# Patient Record
Sex: FEMALE | Race: WHITE | NOT HISPANIC OR LATINO | Employment: FULL TIME | URBAN - METROPOLITAN AREA
[De-identification: names, ages, dates, MRNs, and addresses within clinical notes are randomized per-mention and may not be internally consistent; named-entity substitution may affect disease eponyms.]

---

## 2017-04-24 DIAGNOSIS — F32.A DEPRESSION, UNSPECIFIED DEPRESSION TYPE: ICD-10-CM

## 2017-04-25 RX ORDER — VENLAFAXINE HYDROCHLORIDE 150 MG/1
150 TABLET, EXTENDED RELEASE ORAL
Qty: 30 EACH | Refills: 1 | Status: SHIPPED | OUTPATIENT
Start: 2017-04-25 | End: 2017-05-16 | Stop reason: SDUPTHER

## 2017-05-16 ENCOUNTER — OFFICE VISIT (OUTPATIENT)
Dept: FAMILY MEDICINE CLINIC | Facility: CLINIC | Age: 42
End: 2017-05-16

## 2017-05-16 VITALS
DIASTOLIC BLOOD PRESSURE: 72 MMHG | WEIGHT: 122.4 LBS | HEART RATE: 78 BPM | SYSTOLIC BLOOD PRESSURE: 100 MMHG | HEIGHT: 65 IN | BODY MASS INDEX: 20.39 KG/M2 | OXYGEN SATURATION: 98 %

## 2017-05-16 DIAGNOSIS — F32.A FATIGUE DUE TO DEPRESSION: ICD-10-CM

## 2017-05-16 DIAGNOSIS — L40.9 PSORIASIS: ICD-10-CM

## 2017-05-16 DIAGNOSIS — M25.50 PAIN, JOINT, MULTIPLE SITES: Primary | ICD-10-CM

## 2017-05-16 DIAGNOSIS — R53.83 FATIGUE DUE TO DEPRESSION: ICD-10-CM

## 2017-05-16 DIAGNOSIS — F33.41 RECURRENT MAJOR DEPRESSIVE DISORDER, IN PARTIAL REMISSION (HCC): ICD-10-CM

## 2017-05-16 DIAGNOSIS — F32.A DEPRESSION, UNSPECIFIED DEPRESSION TYPE: ICD-10-CM

## 2017-05-16 PROCEDURE — 99214 OFFICE O/P EST MOD 30 MIN: CPT | Performed by: INTERNAL MEDICINE

## 2017-05-16 PROCEDURE — 73130 X-RAY EXAM OF HAND: CPT | Performed by: INTERNAL MEDICINE

## 2017-05-16 RX ORDER — VENLAFAXINE HYDROCHLORIDE 150 MG/1
150 TABLET, EXTENDED RELEASE ORAL
Qty: 90 EACH | Refills: 1 | Status: SHIPPED | OUTPATIENT
Start: 2017-05-16 | End: 2017-05-18 | Stop reason: SDUPTHER

## 2017-05-18 DIAGNOSIS — F32.A DEPRESSION, UNSPECIFIED DEPRESSION TYPE: ICD-10-CM

## 2017-05-18 RX ORDER — VENLAFAXINE HYDROCHLORIDE 150 MG/1
150 TABLET, EXTENDED RELEASE ORAL
Qty: 90 EACH | Refills: 1 | Status: CANCELLED | OUTPATIENT
Start: 2017-05-18

## 2017-05-18 RX ORDER — VENLAFAXINE HYDROCHLORIDE 150 MG/1
150 TABLET, EXTENDED RELEASE ORAL
Qty: 90 EACH | Refills: 1 | Status: SHIPPED | OUTPATIENT
Start: 2017-05-18 | End: 2017-08-18 | Stop reason: SDUPTHER

## 2017-05-23 LAB
BASOPHILS # BLD AUTO: 0.04 10*3/MM3 (ref 0–0.2)
BASOPHILS NFR BLD AUTO: 0.7 % (ref 0–1.5)
CCP IGA+IGG SERPL IA-ACNC: 3 UNITS (ref 0–19)
CRP SERPL-MCNC: <0.3 MG/L (ref 0–4.9)
EOSINOPHIL # BLD AUTO: 0.36 10*3/MM3 (ref 0–0.7)
EOSINOPHIL NFR BLD AUTO: 6.1 % (ref 0.3–6.2)
ERYTHROCYTE [DISTWIDTH] IN BLOOD BY AUTOMATED COUNT: 12.9 % (ref 11.7–13)
ERYTHROCYTE [SEDIMENTATION RATE] IN BLOOD BY WESTERGREN METHOD: 2 MM/HR (ref 0–32)
HCT VFR BLD AUTO: 43.4 % (ref 35.6–45.5)
HGB BLD-MCNC: 14.3 G/DL (ref 11.9–15.5)
HLA-B27 QL NAA+PROBE: NEGATIVE
IMM GRANULOCYTES # BLD: 0.02 10*3/MM3 (ref 0–0.03)
IMM GRANULOCYTES NFR BLD: 0.3 % (ref 0–0.5)
IRON SATN MFR SERPL: 31 % (ref 20–50)
IRON SERPL-MCNC: 138 MCG/DL (ref 37–145)
LYMPHOCYTES # BLD AUTO: 1.65 10*3/MM3 (ref 0.9–4.8)
LYMPHOCYTES NFR BLD AUTO: 27.7 % (ref 19.6–45.3)
MCH RBC QN AUTO: 31 PG (ref 26.9–32)
MCHC RBC AUTO-ENTMCNC: 32.9 G/DL (ref 32.4–36.3)
MCV RBC AUTO: 94.1 FL (ref 80.5–98.2)
MONOCYTES # BLD AUTO: 0.34 10*3/MM3 (ref 0.2–1.2)
MONOCYTES NFR BLD AUTO: 5.7 % (ref 5–12)
NEUTROPHILS # BLD AUTO: 3.54 10*3/MM3 (ref 1.9–8.1)
NEUTROPHILS NFR BLD AUTO: 59.5 % (ref 42.7–76)
PLATELET # BLD AUTO: 258 10*3/MM3 (ref 140–500)
RBC # BLD AUTO: 4.61 10*6/MM3 (ref 3.9–5.2)
RHEUMATOID FACT SERPL-ACNC: <10 IU/ML (ref 0–13.9)
T3FREE SERPL-MCNC: 2.7 PG/ML (ref 2–4.4)
T4 FREE SERPL-MCNC: 0.96 NG/DL (ref 0.93–1.7)
TIBC SERPL-MCNC: 449 MCG/DL
TSH SERPL DL<=0.005 MIU/L-ACNC: 1.95 MIU/ML (ref 0.27–4.2)
UIBC SERPL-MCNC: 311 MCG/DL
VIT B12 SERPL-MCNC: 401 PG/ML (ref 211–946)
WBC # BLD AUTO: 5.95 10*3/MM3 (ref 4.5–10.7)

## 2017-06-28 ENCOUNTER — OFFICE VISIT (OUTPATIENT)
Dept: FAMILY MEDICINE CLINIC | Facility: CLINIC | Age: 42
End: 2017-06-28

## 2017-06-28 VITALS
HEIGHT: 65 IN | WEIGHT: 121.6 LBS | DIASTOLIC BLOOD PRESSURE: 72 MMHG | SYSTOLIC BLOOD PRESSURE: 106 MMHG | OXYGEN SATURATION: 98 % | HEART RATE: 84 BPM | BODY MASS INDEX: 20.26 KG/M2

## 2017-06-28 DIAGNOSIS — J06.9 ACUTE URI: ICD-10-CM

## 2017-06-28 DIAGNOSIS — M25.50 PAIN, JOINT, MULTIPLE SITES: Primary | ICD-10-CM

## 2017-06-28 PROCEDURE — 99214 OFFICE O/P EST MOD 30 MIN: CPT | Performed by: INTERNAL MEDICINE

## 2017-06-28 RX ORDER — MELOXICAM 15 MG/1
15 TABLET ORAL DAILY
Qty: 90 TABLET | Refills: 0 | Status: SHIPPED | OUTPATIENT
Start: 2017-06-28 | End: 2017-12-06 | Stop reason: ALTCHOICE

## 2017-06-28 RX ORDER — RANITIDINE 150 MG/1
150 CAPSULE ORAL 2 TIMES DAILY
Qty: 180 CAPSULE | Refills: 0 | Status: SHIPPED | OUTPATIENT
Start: 2017-06-28 | End: 2018-06-28

## 2017-07-12 ENCOUNTER — OFFICE VISIT (OUTPATIENT)
Dept: OBSTETRICS AND GYNECOLOGY | Age: 42
End: 2017-07-12

## 2017-07-12 VITALS
BODY MASS INDEX: 20.66 KG/M2 | HEIGHT: 65 IN | WEIGHT: 124 LBS | DIASTOLIC BLOOD PRESSURE: 70 MMHG | SYSTOLIC BLOOD PRESSURE: 98 MMHG

## 2017-07-12 DIAGNOSIS — Z12.31 VISIT FOR SCREENING MAMMOGRAM: ICD-10-CM

## 2017-07-12 DIAGNOSIS — Z01.419 WELL WOMAN EXAM WITH ROUTINE GYNECOLOGICAL EXAM: Primary | ICD-10-CM

## 2017-07-12 LAB
BILIRUB BLD-MCNC: NEGATIVE MG/DL
GLUCOSE UR STRIP-MCNC: NEGATIVE MG/DL
KETONES UR QL: NEGATIVE
LEUKOCYTE EST, POC: NEGATIVE
NITRITE UR-MCNC: NEGATIVE MG/ML
PH UR: 7 [PH] (ref 5–8)
PROT UR STRIP-MCNC: NEGATIVE MG/DL
RBC # UR STRIP: NEGATIVE /UL
SP GR UR: 1.02 (ref 1–1.03)
UROBILINOGEN UR QL: NORMAL

## 2017-07-12 PROCEDURE — 99396 PREV VISIT EST AGE 40-64: CPT | Performed by: OBSTETRICS & GYNECOLOGY

## 2017-07-12 PROCEDURE — 81003 URINALYSIS AUTO W/O SCOPE: CPT | Performed by: OBSTETRICS & GYNECOLOGY

## 2017-07-12 NOTE — PROGRESS NOTES
"ANNUAL GYN EXAM        2017    Subjective     Xochilt Alfonso is a 42 y.o., , White  Female.    Chief Complaint   Patient presents with   • Gynecologic Exam     Xochilt is here for her Annual Exam and Mammogram.  No gyn complaints        History of Present Illness:     Gyn Complaints:  No GYN complaints     1.Menstrual Hx:  LMP  2017, sometimes she starts four days early, she had a postpartum tubal in .     2.Pap Smear Hx:  ,CIS. , LLETZ= JUAN ALBERTO-2 and 3.  We will always have yearly Pap smears.      3.Breast / Ovarian Hx:  Regular SBE.       Family history of breast cancer: None     Family history of ovarian cancer:   Mother had cervical cancer dx at 60.  Mother's half sister dx ovarian cancer in her early 60's    4.Family Hx of Colon Ca:  Paternal Grandfather dx in his late 50's      Family Hx of Uterine Ca:   Mother, cervical.    5. New Past Medical History: None   Past Medical History:   Diagnosis Date   • Acute sinusitis    • Anemia    • Bronchitis    • CIS (carcinoma in situ of cervix)     LLETZ   • Depression 2016   • Depression    • Dysmenorrhea    • Dyspareunia in female    • Fatigue    • Hot flashes    • Menopause     40'S    • Menorrhagia    • Pelvic pain    • Pituitary microadenoma    • Post partum depression    • Prolactinoma    • Psoriasis 2017   • Vaginal delivery        \"NATALIE\"    \"DEBBI\"     \"STAR\"     \"RONY\"    \"MATTHEW\"    • Varicella         6. New Past Surgical History: None   Past Surgical History:   Procedure Laterality Date   • AUGMENTATION MAMMAPLASTY     • BREAST AUGMENTATION      Dr. Minor French   • CERVICAL BIOPSY  W/ LOOP ELECTRODE EXCISION      LLETZ of the cervix for CIS on bx.  Path showed JUAN ALBERTO-2 and JUAN ALBERTO-3.     • DILATATION AND CURETTAGE      SAB, blighted ovum   • POSTPARTUM TUBAL LIGATION  2015   • WISDOM TOOTH EXTRACTION      4         7. New Family Medical History: None "   Family History   Problem Relation Age of Onset   • Heart disease Maternal Grandmother    • Lung cancer Maternal Grandfather    • Alcohol abuse Maternal Grandfather    • Colon cancer Paternal Grandfather    • Alcohol abuse Paternal Grandfather    • Arthritis Mother    • Depression Mother    • Cervical cancer Mother    • Alcohol abuse Father    • Arthritis Father    • Depression Father    • Hyperlipidemia Father    • Hypertension Father    • No Known Problems Daughter    • No Known Problems Daughter    • No Known Problems Daughter    • No Known Problems Daughter    • No Known Problems Daughter    • No Known Problems Brother    • No Known Problems Son    • Osteoporosis Paternal Grandmother    • Arthritis Paternal Grandmother    • Diabetes Maternal Aunt    • No Known Problems Paternal Aunt    • No Known Problems Brother    • BRCA 1/2 Neg Hx    • Breast cancer Neg Hx    • Endometrial cancer Neg Hx    • Ovarian cancer Neg Hx          8.   OB History    Para Term  AB SAB TAB Ectopic Multiple Living   5 4 3 1 1 1 0 0 0 4      # Outcome Date GA Lbr Miki/2nd Weight Sex Delivery Anes PTL Lv   5 SAB  7w0d             Birth Comments: DNC, blighted ovum.   4   35w0d   F Vag-Spont   Y      Birth Comments: PPROM   3 Term  40w0d   F Vag-Spont   Y      Birth Comments: Procardia for early cervical dilation.  Had a LLETZ biopsy of cervix in .   2 Term  40w0d   F Vag-Spont   Y   1 Term  40w0d   F Vag-Spont   Y           9.   Health Maintenance   Topic Date Due   • TDAP/TD VACCINES (1 - Tdap) 1995   • INFLUENZA VACCINE  2017   • PAP SMEAR  2020       The following portions of the patient's history were reviewed / updated as appropriate: allergies, current medications, past medical history, past surgical history, past family history, past social history, and problem list.    Review of Systems   Constitutional: Positive for fatigue (Lack of energy).   HENT: Positive for  "rhinorrhea and sinus pressure (Seasonal).    Endocrine:        Night sweats before her cycles.   Musculoskeletal: Positive for arthralgias (Migratory arthritis).   Allergic/Immunologic: Positive for environmental allergies (seasonal allergies).   Psychiatric/Behavioral: Positive for dysphoric mood (depressionseasonal). The patient is nervous/anxious.        Objective     BP 98/70  Ht 65\" (165.1 cm)  Wt 124 lb (56.2 kg)  LMP 06/23/2017  BMI 20.63 kg/m2    PHYSICAL EXAM    Constitutional: Well-developed, well-nourished, thin white female, in no distress, alert and well oriented ×3.    Skin is warm, dry, without rash.       Neck appears normal, trachea in the midline.  Thyroid exam normal.          No cervical lymphadenopathy.    Lungs clear to auscultation.  Chest wall appears normal.    Heart with regular rhythm without murmur or gallop.    Breasts:         Right breast nontender, without dominant mass.  No nipple discharge.            No superficial skin changes.  No axillary adenopathy.        Left breast nontender, without dominant mass.  No nipple discharge.            No superficial skin changes.  No axillary adenopathy.      Abdomen is flat, soft and nontender.No palpable mass.           No hepatosplenomegaly.  Flanks are negative.          Bowel sounds normal.  No abdominal bruit.          No inguinal lymphadenopathy bilaterally.     Pelvic exam :  Vulva normal.           External genitalia normal.  BUS negative.             Introitus normal.  Vaginal mucosa normal.            Cervix normal, Pap with reflex., no cervical motion tenderness.          Uterus retroverted, normal size, normal shape, and position.          Adnexa are negative bilaterally.  No tenderness.  No palpable mass.          Rectovaginal exam negative .      Musc /Skel: Lower extremities without edema.     Neuro: Coordination is grossly normal.  Gait is normal.    Psych: Mood and affect is normal.  Behavior normal.  Thought content " normal.            Judgment normal.        Assessment/Plan   Xochilt was seen today for gynecologic exam.    Diagnoses and all orders for this visit:    Well woman exam with routine gynecological exam  -     POC Urinalysis Dipstick, Automated  -     Pap IG, Rfx HPV All Pth    Visit for screening mammogram    Health Counselling: Xochilt is doing well, although busy with 4 kids at the house.  Mental health is stable although she does have some anxiety and sometimes some depression.  The Effexor is helping.  She eats pretty well and is staying active.    COMMENTS:    Awais Edge MD

## 2017-07-14 LAB
CONV .: NORMAL
CYTOLOGIST CVX/VAG CYTO: NORMAL
CYTOLOGY CVX/VAG DOC THIN PREP: NORMAL
DX ICD CODE: NORMAL
HIV 1 & 2 AB SER-IMP: NORMAL
OTHER STN SPEC: NORMAL
PATH REPORT.FINAL DX SPEC: NORMAL
STAT OF ADQ CVX/VAG CYTO-IMP: NORMAL

## 2017-08-18 DIAGNOSIS — F32.A DEPRESSION, UNSPECIFIED DEPRESSION TYPE: ICD-10-CM

## 2017-08-18 RX ORDER — VENLAFAXINE HYDROCHLORIDE 150 MG/1
150 TABLET, EXTENDED RELEASE ORAL
Qty: 90 EACH | Refills: 1 | Status: SHIPPED | OUTPATIENT
Start: 2017-08-18 | End: 2017-08-23

## 2017-08-23 RX ORDER — VENLAFAXINE HYDROCHLORIDE 150 MG/1
150 CAPSULE, EXTENDED RELEASE ORAL DAILY
Qty: 90 CAPSULE | Refills: 0 | Status: SHIPPED | OUTPATIENT
Start: 2017-08-23 | End: 2018-03-05 | Stop reason: SDUPTHER

## 2017-11-06 RX ORDER — VENLAFAXINE HYDROCHLORIDE 150 MG/1
CAPSULE, EXTENDED RELEASE ORAL
Qty: 90 CAPSULE | Refills: 0 | OUTPATIENT
Start: 2017-11-06

## 2017-11-29 ENCOUNTER — OFFICE VISIT (OUTPATIENT)
Dept: FAMILY MEDICINE CLINIC | Facility: CLINIC | Age: 42
End: 2017-11-29

## 2017-11-29 VITALS
BODY MASS INDEX: 19.73 KG/M2 | TEMPERATURE: 98 F | HEIGHT: 65 IN | WEIGHT: 118.4 LBS | SYSTOLIC BLOOD PRESSURE: 100 MMHG | HEART RATE: 63 BPM | OXYGEN SATURATION: 95 % | DIASTOLIC BLOOD PRESSURE: 82 MMHG

## 2017-11-29 DIAGNOSIS — R09.1 PLEURISY: Primary | ICD-10-CM

## 2017-11-29 PROCEDURE — 96372 THER/PROPH/DIAG INJ SC/IM: CPT | Performed by: NURSE PRACTITIONER

## 2017-11-29 PROCEDURE — 99214 OFFICE O/P EST MOD 30 MIN: CPT | Performed by: NURSE PRACTITIONER

## 2017-11-29 PROCEDURE — 71020 XR CHEST PA AND LATERAL: CPT | Performed by: NURSE PRACTITIONER

## 2017-11-29 RX ORDER — PREDNISONE 10 MG/1
TABLET ORAL
Qty: 39 TABLET | Refills: 0 | Status: SHIPPED | OUTPATIENT
Start: 2017-11-29 | End: 2018-03-06

## 2017-11-29 RX ORDER — METHYLPREDNISOLONE SODIUM SUCCINATE 40 MG/ML
40 INJECTION, POWDER, LYOPHILIZED, FOR SOLUTION INTRAMUSCULAR; INTRAVENOUS ONCE
Status: COMPLETED | OUTPATIENT
Start: 2017-11-29 | End: 2017-11-29

## 2017-11-29 RX ADMIN — METHYLPREDNISOLONE SODIUM SUCCINATE 40 MG: 40 INJECTION, POWDER, LYOPHILIZED, FOR SOLUTION INTRAMUSCULAR; INTRAVENOUS at 16:53

## 2017-11-29 NOTE — PROGRESS NOTES
Subjective   Xochilt Alfonso is a 42 y.o. female who presents today for:    Shoulder Pain (Pain in Lt shoulder blade and hurts to breath that radiates to the front)    HPI Comments: Ms. Alfonso presents with complaints of pain that is located under her left scapula that started 10 days ago. The pain has been worsening and now she states it hurts to take a deep breath, cough or move.  When she coughs she states the pain radiates to her chest. She denies URI symptoms now but states she did have severe allergy symptoms weeks ago. She denies wheezing or shortness of breath. She has been taking OTC pain medications for her symptoms.     I have reviewed the patient's medical history in detail and updated the computerized patient record.    Ms. Alfonso  reports that she quit smoking about 3 years ago. Her smoking use included Cigarettes. She has a 20.00 pack-year smoking history. She has never used smokeless tobacco. She reports that she does not drink alcohol or use illicit drugs.     Allergies   Allergen Reactions   • Erythromycin Nausea And Vomiting   • Wellbutrin [Bupropion] Hives   • Codeine Itching and Rash       Current Outpatient Prescriptions:   •  meloxicam (MOBIC) 15 MG tablet, Take 1 tablet by mouth Daily., Disp: 90 tablet, Rfl: 0  •  ranitidine (ZANTAC) 150 MG capsule, Take 1 capsule by mouth 2 (Two) Times a Day., Disp: 180 capsule, Rfl: 0  •  venlafaxine XR (EFFEXOR-XR) 150 MG 24 hr capsule, Take 1 capsule by mouth Daily., Disp: 90 capsule, Rfl: 0      Review of Systems   Constitutional: Negative.    HENT: Negative.    Respiratory: Positive for cough (intermittent, nonproductive). Negative for chest tightness.    Cardiovascular: Positive for chest pain (see HPI). Negative for palpitations and leg swelling.   Musculoskeletal:        Pain in left scapula   Skin: Negative.    Neurological: Negative.          Objective   Vitals:    11/29/17 1602   BP: 100/82   BP Location: Left arm   Patient Position: Sitting   Cuff  "Size: Adult   Pulse: 63   Temp: 98 °F (36.7 °C)   TempSrc: Oral   SpO2: 95%   Weight: 118 lb 6.4 oz (53.7 kg)   Height: 65\" (165.1 cm)     Physical Exam   Constitutional: She is oriented to person, place, and time. She appears well-developed and well-nourished.   HENT:   Right Ear: External ear normal.   Left Ear: External ear normal.   Nose: Nose normal.   Mouth/Throat: Oropharynx is clear and moist.   Cardiovascular: Normal rate, regular rhythm and normal heart sounds.    Pulmonary/Chest: Effort normal and breath sounds normal. No respiratory distress. She has no wheezes. She has no rales. She exhibits no tenderness.   Musculoskeletal: Normal range of motion. She exhibits tenderness (left scapula). She exhibits no edema.   Neurological: She is alert and oriented to person, place, and time.   Skin: Skin is warm and dry.   Psychiatric:   No acute distress   Vitals reviewed.        Assessment/Plan   Xochilt was seen today for shoulder pain.    Diagnoses and all orders for this visit:    Pleurisy  -     methylPREDNISolone sodium succinate (SOLU-Medrol) injection 40 mg; Inject 1 mL into the shoulder, thigh, or buttocks 1 (One) Time.    Other orders  -     predniSONE (DELTASONE) 10 MG tablet; 3 tabs twice a day for 3 days, 2 tabs twice a day for 3 days, 1 tab twice a day for 3 days and 1 tab daily for 3 days    1. Pleuritic pain located on the left side. She has been given Solu-medrol 40 mg IM today in the office. She is to start Prednisone 10 mg po as directed tomorrow.  She may alternate heat with cold for 20 minutes each as needed.  2. I reviewed her chest x-ray and I do not have any other films to compare it to. The chest x-ray appears to be within normal limits. 3.Follow up if symptoms worsen or are not resolved.    "

## 2017-12-06 ENCOUNTER — OFFICE VISIT (OUTPATIENT)
Dept: FAMILY MEDICINE CLINIC | Facility: CLINIC | Age: 42
End: 2017-12-06

## 2017-12-06 VITALS
DIASTOLIC BLOOD PRESSURE: 70 MMHG | WEIGHT: 121.1 LBS | HEIGHT: 65 IN | TEMPERATURE: 98.1 F | SYSTOLIC BLOOD PRESSURE: 100 MMHG | BODY MASS INDEX: 20.18 KG/M2 | HEART RATE: 74 BPM | OXYGEN SATURATION: 100 %

## 2017-12-06 DIAGNOSIS — R07.81 PLEURITIC PAIN: Primary | ICD-10-CM

## 2017-12-06 DIAGNOSIS — T14.8XXA MUSCLE STRAIN: ICD-10-CM

## 2017-12-06 PROCEDURE — 99213 OFFICE O/P EST LOW 20 MIN: CPT | Performed by: NURSE PRACTITIONER

## 2017-12-06 RX ORDER — CYCLOBENZAPRINE HCL 5 MG
5 TABLET ORAL 3 TIMES DAILY PRN
Qty: 90 TABLET | Refills: 0 | Status: SHIPPED | OUTPATIENT
Start: 2017-12-06 | End: 2018-03-06

## 2017-12-06 NOTE — PROGRESS NOTES
Subjective   Xochilt Alfonso is a 42 y.o. female who presents today for:    Chest Pain (Lt side and Lt shoulder blade area)    HPI Comments: Ms. Alfonso returns today because she is still having left side pleuritic pain that radiates from her back to her chest. 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 She has 2 doses of the prednisone left to take.      I have reviewed the patient's medical history in detail and updated the computerized patient record.    Ms. Alfonso  reports that she quit smoking about 3 years ago. Her smoking use included Cigarettes. She has a 20.00 pack-year smoking history. She has never used smokeless tobacco. She reports that she does not drink alcohol or use illicit drugs.     Allergies   Allergen Reactions   • Erythromycin Nausea And Vomiting   • Wellbutrin [Bupropion] Hives   • Codeine Itching and Rash       Current Outpatient Prescriptions:   •  predniSONE (DELTASONE) 10 MG tablet, 3 tabs twice a day for 3 days, 2 tabs twice a day for 3 days, 1 tab twice a day for 3 days and 1 tab daily for 3 days, Disp: 39 tablet, Rfl: 0  •  ranitidine (ZANTAC) 150 MG capsule, Take 1 capsule by mouth 2 (Two) Times a Day., Disp: 180 capsule, Rfl: 0  •  venlafaxine XR (EFFEXOR-XR) 150 MG 24 hr capsule, Take 1 capsule by mouth Daily., Disp: 90 capsule, Rfl: 0  •  cyclobenzaprine (FLEXERIL) 5 MG tablet, Take 1 tablet by mouth 3 (Three) Times a Day As Needed for Muscle Spasms., Disp: 90 tablet, Rfl: 0  •  diclofenac sodium (VOTAREN XR) 100 MG 24 hr tablet, Take 1 tablet by mouth 3 (Three) Times a Day., Disp: 90 tablet, Rfl: 0      Review of Systems   Constitutional: Negative.    Respiratory: Negative.    Cardiovascular: Positive for chest pain (pleurritic pain). Negative for palpitations and leg swelling.   Musculoskeletal: Positive for back pain (pleuritic pain).   Skin: Negative.   "  Neurological: Negative.          Objective   Vitals:    12/06/17 1446   BP: 100/70   BP Location: Right arm   Patient Position: Sitting   Cuff Size: Adult   Pulse: 74   Temp: 98.1 °F (36.7 °C)   TempSrc: Oral   SpO2: 100%   Weight: 54.9 kg (121 lb 1.6 oz)   Height: 165.1 cm (65\")     Physical Exam   Constitutional: She is oriented to person, place, and time. She appears well-developed and well-nourished.   Cardiovascular: Normal rate, regular rhythm and normal heart sounds.    Pulmonary/Chest: Effort normal and breath sounds normal.   Neurological: She is alert and oriented to person, place, and time.   Skin: Skin is warm and dry.   Psychiatric:   No acute distress   Vitals reviewed.        Assessment/Plan   Xochilt was seen today for chest pain.    Diagnoses and all orders for this visit:    Pleuritic pain  -     cyclobenzaprine (FLEXERIL) 5 MG tablet; Take 1 tablet by mouth 3 (Three) Times a Day As Needed for Muscle Spasms.  -     diclofenac sodium (VOTAREN XR) 100 MG 24 hr tablet; Take 1 tablet by mouth 3 (Three) Times a Day.    Muscle strain    1. She is to finish the prednisone as prescribe. She may take cyclobenzaprine 5 mg three times a day as needed for muscle spasms. She can also start Diclofenac  mg two - three times a day as needed for pain.We discussed that pleuritic pain can take as much as 6 + weeks to resolve.   2. She is to follow up as needed.  "

## 2018-03-05 RX ORDER — VENLAFAXINE HYDROCHLORIDE 150 MG/1
150 CAPSULE, EXTENDED RELEASE ORAL DAILY
Qty: 90 CAPSULE | Refills: 0 | Status: SHIPPED | OUTPATIENT
Start: 2018-03-05 | End: 2018-03-06 | Stop reason: SDUPTHER

## 2018-03-05 RX ORDER — MELOXICAM 15 MG/1
15 TABLET ORAL DAILY
Qty: 90 TABLET | Refills: 0 | Status: SHIPPED | OUTPATIENT
Start: 2018-03-05 | End: 2018-06-10 | Stop reason: SDUPTHER

## 2018-03-05 RX ORDER — MELOXICAM 15 MG/1
TABLET ORAL
Qty: 90 TABLET | Refills: 0 | OUTPATIENT
Start: 2018-03-05

## 2018-03-06 ENCOUNTER — OFFICE VISIT (OUTPATIENT)
Dept: FAMILY MEDICINE CLINIC | Facility: CLINIC | Age: 43
End: 2018-03-06

## 2018-03-06 VITALS
BODY MASS INDEX: 19.24 KG/M2 | HEIGHT: 65 IN | DIASTOLIC BLOOD PRESSURE: 72 MMHG | HEART RATE: 76 BPM | SYSTOLIC BLOOD PRESSURE: 110 MMHG | OXYGEN SATURATION: 98 % | WEIGHT: 115.5 LBS

## 2018-03-06 DIAGNOSIS — M25.50 PAIN, JOINT, MULTIPLE SITES: Primary | ICD-10-CM

## 2018-03-06 DIAGNOSIS — Z79.899 ENCOUNTER FOR LONG-TERM (CURRENT) USE OF MEDICATIONS: ICD-10-CM

## 2018-03-06 DIAGNOSIS — F33.41 RECURRENT MAJOR DEPRESSIVE DISORDER, IN PARTIAL REMISSION (HCC): ICD-10-CM

## 2018-03-06 PROCEDURE — 99213 OFFICE O/P EST LOW 20 MIN: CPT | Performed by: INTERNAL MEDICINE

## 2018-03-06 RX ORDER — CETIRIZINE HYDROCHLORIDE, PSEUDOEPHEDRINE HYDROCHLORIDE 5; 120 MG/1; MG/1
1 TABLET, FILM COATED, EXTENDED RELEASE ORAL 2 TIMES DAILY
COMMUNITY
End: 2018-05-09 | Stop reason: SDUPTHER

## 2018-03-06 RX ORDER — VENLAFAXINE HYDROCHLORIDE 150 MG/1
150 CAPSULE, EXTENDED RELEASE ORAL DAILY
Qty: 90 CAPSULE | Refills: 1 | Status: SHIPPED | OUTPATIENT
Start: 2018-03-06 | End: 2018-03-06 | Stop reason: SDUPTHER

## 2018-03-06 RX ORDER — VENLAFAXINE HYDROCHLORIDE 150 MG/1
150 CAPSULE, EXTENDED RELEASE ORAL DAILY
Qty: 90 CAPSULE | Refills: 1 | Status: SHIPPED | OUTPATIENT
Start: 2018-03-06 | End: 2019-01-23

## 2018-03-06 NOTE — PROGRESS NOTES
Subjective   Xochilt Alfonso is a 43 y.o. female who presents today for:    Pain (f/u Joint Pain) and Depression (f/u)    History of Present Illness     She reports diffuse joint pains going back several years. Most recently, her right shoulder has been bothering her fairly consistently, worse with certain movements. Evaluation in May, 2017, was negative for any evidence of inflammatory arthritis.  Mobic 15 mg helps to keep her joint pains at bay. 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    Depression: She was started on an SSRI for post-partum depression several years ago, but did better overall on it, unmasking underlying depression. She noticed improved focus at work, felt less down, but still c/o decreased energy. Sertraline was prescribed by her GYN after her last delivery (2015); she went off it for a while due to lack of insurance and felt poorly.     On Prozac, Paxil, Celexa, others in the past without benefit; Wellbutrin caused chest pain and hives. Zoloft helps, but Effexor has helped better than anything.          Ms. Alfonso  reports that she quit smoking about 4 years ago. Her smoking use included Cigarettes. She has a 20.00 pack-year smoking history. She has never used smokeless tobacco. She reports that she does not drink alcohol or use illicit drugs.     Allergies   Allergen Reactions   • Erythromycin Nausea And Vomiting   • Wellbutrin [Bupropion] Hives   • Codeine Itching and Rash       Current Outpatient Prescriptions:   •  cetirizine-pseudoephedrine (ZYRTEC-D ALLERGY & CONGESTION) 5-120 MG per 12 hr tablet, Take 1 tablet by mouth 2 (Two) Times a Day., Disp: , Rfl:   •  meloxicam (MOBIC) 15 MG tablet, Take 1 tablet by mouth Daily., Disp: 90 tablet, Rfl: 0  •  ranitidine (ZANTAC) 150 MG capsule, Take 1 capsule by mouth 2 (Two) Times a Day., Disp: 180 capsule, Rfl: 0  •  venlafaxine XR (EFFEXOR-XR) 150 MG 24 hr capsule, Take 1 capsule by mouth Daily., Disp:  "90 capsule, Rfl: 0      Review of Systems   HENT: Negative for trouble swallowing.    Cardiovascular: Negative for leg swelling.   Musculoskeletal: Positive for arthralgias. Negative for back pain, gait problem, joint swelling and myalgias.   Skin: Positive for rash (psoriasis).   Hematological: Negative for adenopathy.   Psychiatric/Behavioral: Positive for dysphoric mood. The patient is not nervous/anxious.          Objective   Vitals:    03/06/18 1528   BP: 116/88   BP Location: Left arm   Patient Position: Sitting   Cuff Size: Small Adult   Pulse: 76   SpO2: 98%   Weight: 52.4 kg (115 lb 8 oz)   Height: 165.1 cm (65\")     Physical Exam    Thin white female in no acute distress.   Affect is mildly blunted. Insight and judgment are normal. She answers all questions appropriately.  No conjunctival injection.  No evidence of active synovitis in her hands. No erythema, warmth, or effusion about the right shoulder. There is full range of motion of the right MARIBETH. There is no significant pain with passive range of motion testing.    Upper extremity strength is 5+/5 bilaterally.      Assessment/Plan   Xochilt was seen today for pain and depression.    Diagnoses and all orders for this visit:    Pain, joint, multiple sites  Continuetaking the meloxicam as often as necessary, but she will try taking one half tablet (7.5 mg) at a time to see if that is as effective as the 15 mg dose.  She will contact us in the next 3 months to let us know how she's doing on the lower dose or if she needs the higher dose again.    Recurrent major depressive disorder, in partial remission  -     venlafaxine XR (EFFEXOR-XR) 150 MG 24 hr capsule; Take 1 capsule by mouth Daily.  She will continue the Effexor unchanged.    I have asked her to return to get labs drawn some morning this week in order to monitor her kidney function and liver and sodium levels.  "

## 2018-03-07 LAB
ALBUMIN SERPL-MCNC: 4.4 G/DL (ref 3.5–5.2)
ALBUMIN/GLOB SERPL: 2 G/DL
ALP SERPL-CCNC: 60 U/L (ref 39–117)
ALT SERPL-CCNC: 12 U/L (ref 1–33)
AST SERPL-CCNC: 14 U/L (ref 1–32)
BILIRUB SERPL-MCNC: 0.2 MG/DL (ref 0.1–1.2)
BUN SERPL-MCNC: 10 MG/DL (ref 6–20)
BUN/CREAT SERPL: 12.7 (ref 7–25)
CALCIUM SERPL-MCNC: 9.7 MG/DL (ref 8.6–10.5)
CHLORIDE SERPL-SCNC: 101 MMOL/L (ref 98–107)
CO2 SERPL-SCNC: 32.3 MMOL/L (ref 22–29)
CREAT SERPL-MCNC: 0.79 MG/DL (ref 0.57–1)
ERYTHROCYTE [DISTWIDTH] IN BLOOD BY AUTOMATED COUNT: 13.1 % (ref 11.7–13)
GFR SERPLBLD CREATININE-BSD FMLA CKD-EPI: 80 ML/MIN/1.73
GFR SERPLBLD CREATININE-BSD FMLA CKD-EPI: 97 ML/MIN/1.73
GLOBULIN SER CALC-MCNC: 2.2 GM/DL
GLUCOSE SERPL-MCNC: 101 MG/DL (ref 65–99)
HCT VFR BLD AUTO: 44.8 % (ref 35.6–45.5)
HGB BLD-MCNC: 14.4 G/DL (ref 11.9–15.5)
MCH RBC QN AUTO: 31.1 PG (ref 26.9–32)
MCHC RBC AUTO-ENTMCNC: 32.1 G/DL (ref 32.4–36.3)
MCV RBC AUTO: 96.8 FL (ref 80.5–98.2)
PLATELET # BLD AUTO: 291 10*3/MM3 (ref 140–500)
POTASSIUM SERPL-SCNC: 4.6 MMOL/L (ref 3.5–5.2)
PROT SERPL-MCNC: 6.6 G/DL (ref 6–8.5)
RBC # BLD AUTO: 4.63 10*6/MM3 (ref 3.9–5.2)
SODIUM SERPL-SCNC: 140 MMOL/L (ref 136–145)
WBC # BLD AUTO: 6.73 10*3/MM3 (ref 4.5–10.7)

## 2018-03-19 NOTE — PROGRESS NOTES
Everything looked good. I am not overly concerned about the sugar of 101 nor the bicarbonate of 32.3.  Blood counts all look good also.  TAS

## 2018-05-09 ENCOUNTER — OFFICE VISIT (OUTPATIENT)
Dept: FAMILY MEDICINE CLINIC | Facility: CLINIC | Age: 43
End: 2018-05-09

## 2018-05-09 VITALS
WEIGHT: 116.2 LBS | SYSTOLIC BLOOD PRESSURE: 116 MMHG | DIASTOLIC BLOOD PRESSURE: 62 MMHG | OXYGEN SATURATION: 96 % | BODY MASS INDEX: 19.36 KG/M2 | HEIGHT: 65 IN | HEART RATE: 91 BPM | TEMPERATURE: 98.2 F

## 2018-05-09 DIAGNOSIS — J20.9 ACUTE BRONCHITIS, UNSPECIFIED ORGANISM: Primary | ICD-10-CM

## 2018-05-09 DIAGNOSIS — J01.40 ACUTE NON-RECURRENT PANSINUSITIS: ICD-10-CM

## 2018-05-09 PROCEDURE — 99214 OFFICE O/P EST MOD 30 MIN: CPT | Performed by: NURSE PRACTITIONER

## 2018-05-09 RX ORDER — AMOXICILLIN AND CLAVULANATE POTASSIUM 875; 125 MG/1; MG/1
1 TABLET, FILM COATED ORAL 2 TIMES DAILY
Qty: 14 TABLET | Refills: 0 | Status: SHIPPED | OUTPATIENT
Start: 2018-05-09 | End: 2018-05-16

## 2018-05-09 RX ORDER — PREDNISONE 20 MG/1
20 TABLET ORAL 2 TIMES DAILY
Qty: 10 TABLET | Refills: 0 | Status: SHIPPED | OUTPATIENT
Start: 2018-05-09 | End: 2018-05-14

## 2018-05-09 RX ORDER — FLUTICASONE PROPIONATE 50 MCG
2 SPRAY, SUSPENSION (ML) NASAL DAILY
COMMUNITY
End: 2022-04-05

## 2018-05-09 RX ORDER — GUAIFENESIN 600 MG/1
1200 TABLET, EXTENDED RELEASE ORAL 2 TIMES DAILY
COMMUNITY
End: 2018-05-09 | Stop reason: ALTCHOICE

## 2018-05-09 NOTE — PROGRESS NOTES
Subjective   Xochilt Alfonso is a 43 y.o. female who presents today for:    Earache (bilateral); Sore Throat; and Wheezing    Ms. Alfonso presents today with allergy symptoms that started 6 weeks ago that are worsening. Over the past week or more she states she has had a pounding headache that will not go away and also sinus pain/pressure. Her cough is worsening and she is wheezing. She denies fever, chills, or body aches. She has been taking OTC medications for her allergies.       I have reviewed the patient's medical history in detail and updated the computerized patient record.    Ms. Alfonso  reports that she quit smoking about 4 years ago. Her smoking use included Cigarettes. She has a 20.00 pack-year smoking history. She has never used smokeless tobacco. She reports that she does not drink alcohol or use drugs.     Allergies   Allergen Reactions   • Erythromycin Nausea And Vomiting   • Wellbutrin [Bupropion] Hives   • Codeine Itching and Rash       Current Outpatient Prescriptions:   •  cetirizine-pseudoephedrine (ZYRTEC-D ALLERGY & CONGESTION) 5-120 MG per 12 hr tablet, Take 1 tablet by mouth 2 (Two) Times a Day., Disp: , Rfl:   •  guaiFENesin (MUCINEX) 600 MG 12 hr tablet, Take 1,200 mg by mouth 2 (Two) Times a Day., Disp: , Rfl:   •  meloxicam (MOBIC) 15 MG tablet, Take 1 tablet by mouth Daily., Disp: 90 tablet, Rfl: 0  •  ranitidine (ZANTAC) 150 MG capsule, Take 1 capsule by mouth 2 (Two) Times a Day., Disp: 180 capsule, Rfl: 0  •  venlafaxine XR (EFFEXOR-XR) 150 MG 24 hr capsule, Take 1 capsule by mouth Daily., Disp: 90 capsule, Rfl: 1      Review of Systems   Constitutional: Negative for chills and fever.   HENT: Positive for congestion, ear pain, postnasal drip, sinus pain and sinus pressure.    Respiratory: Positive for cough (worse at night) and wheezing (upper airway). Negative for shortness of breath.    Cardiovascular: Negative.    Musculoskeletal: Negative.  Negative for myalgias.   Neurological:  "Positive for headaches.         Objective   Vitals:    05/09/18 1330   BP: 116/62   BP Location: Left arm   Patient Position: Sitting   Cuff Size: Small Adult   Pulse: 91   Temp: 98.2 °F (36.8 °C)   TempSrc: Oral   SpO2: 96%   Weight: 52.7 kg (116 lb 3.2 oz)   Height: 165.1 cm (65\")     Physical Exam   Constitutional: She is oriented to person, place, and time. She appears well-developed and well-nourished.   HENT:   Right Ear: External ear normal.   Left Ear: External ear normal.   Nose: Mucosal edema (red and swollen) present. No rhinorrhea. Right sinus exhibits maxillary sinus tenderness and frontal sinus tenderness. Left sinus exhibits maxillary sinus tenderness and frontal sinus tenderness.   Mouth/Throat: Oropharynx is clear and moist.   Neck: Normal range of motion. Neck supple.   Cardiovascular: Normal rate, regular rhythm and normal heart sounds.    Pulmonary/Chest: She has wheezes (both inspiratory and exspiratory).   Lymphadenopathy:     She has no cervical adenopathy.   Neurological: She is alert and oriented to person, place, and time.   Skin: Skin is warm and dry.   Psychiatric:   No acute distress             Xochilt was seen today for earache, sore throat and wheezing.    Diagnoses and all orders for this visit:    Acute bronchitis, unspecified organism  -     predniSONE (DELTASONE) 20 MG tablet; Take 1 tablet by mouth 2 (Two) Times a Day for 5 days.    Acute non-recurrent pansinusitis  -     amoxicillin-clavulanate (AUGMENTIN) 875-125 MG per tablet; Take 1 tablet by mouth 2 (Two) Times a Day for 7 days.    1. She is to start prednisone 20 mg twice a day for 5 days. I have provided her with Dulera 100/5 mcg twice a day for wheezing.  2. She also needs to start taking Augmentin 875/125 mg twice a day for 7 days. She may start taking Mucinex D and Zyrtec for her nasal symptoms. She is to continue with Flonase nasal spray daily.  3. Follow up if her symptoms do not resolve or worsen.  "

## 2018-05-30 ENCOUNTER — OFFICE VISIT (OUTPATIENT)
Dept: FAMILY MEDICINE CLINIC | Facility: CLINIC | Age: 43
End: 2018-05-30

## 2018-05-30 VITALS
TEMPERATURE: 98.3 F | HEART RATE: 79 BPM | HEIGHT: 65 IN | SYSTOLIC BLOOD PRESSURE: 102 MMHG | BODY MASS INDEX: 19.89 KG/M2 | DIASTOLIC BLOOD PRESSURE: 78 MMHG | OXYGEN SATURATION: 94 % | WEIGHT: 119.4 LBS

## 2018-05-30 DIAGNOSIS — R51.9 MIXED HEADACHE: Primary | ICD-10-CM

## 2018-05-30 PROCEDURE — 99214 OFFICE O/P EST MOD 30 MIN: CPT | Performed by: NURSE PRACTITIONER

## 2018-05-30 RX ORDER — IBUPROFEN 800 MG/1
800 TABLET ORAL EVERY 6 HOURS PRN
Qty: 120 TABLET | Refills: 5 | Status: SHIPPED | OUTPATIENT
Start: 2018-05-30 | End: 2018-11-28 | Stop reason: ALTCHOICE

## 2018-05-30 RX ORDER — ACETAMINOPHEN, ASPIRIN AND CAFFEINE 250; 250; 65 MG/1; MG/1; MG/1
1 TABLET, FILM COATED ORAL EVERY 6 HOURS PRN
COMMUNITY
End: 2018-05-30 | Stop reason: ALTCHOICE

## 2018-05-30 RX ORDER — METHOCARBAMOL 500 MG/1
500 TABLET, FILM COATED ORAL 3 TIMES DAILY
Qty: 30 TABLET | Refills: 1 | Status: SHIPPED | OUTPATIENT
Start: 2018-05-30 | End: 2019-01-23

## 2018-05-30 NOTE — PROGRESS NOTES
Subjective   Xochilt Alfonso is a 43 y.o. female who presents today for:    Headache (x 2 weeks- thinks they are migraines)    Ms. Alfonso presents today with complaint of a headache for the past 2 weeks. It is at the base of her neck/skull and radiates around to her temples/jaw. She denies nausea/vomiting. Nose and bright lights seems to aggravated her headache. She has been taking Excedrin Migraine for her headache with little relief. She reports no history of migraines.    I have reviewed the patient's medical history in detail and updated the computerized patient record.       Ms. Alfonso  reports that she quit smoking about 4 years ago. Her smoking use included Cigarettes. She has a 20.00 pack-year smoking history. She has never used smokeless tobacco. She reports that she does not drink alcohol or use drugs.     Allergies   Allergen Reactions   • Erythromycin Nausea And Vomiting   • Wellbutrin [Bupropion] Hives   • Codeine Itching and Rash       Current Outpatient Prescriptions:   •  Cetirizine-Pseudoephedrine (ZYRTEC-D PO), Take  by mouth., Disp: , Rfl:   •  fluticasone (FLONASE) 50 MCG/ACT nasal spray, 2 sprays into each nostril Daily., Disp: , Rfl:   •  meloxicam (MOBIC) 15 MG tablet, Take 1 tablet by mouth Daily., Disp: 90 tablet, Rfl: 0  •  ranitidine (ZANTAC) 150 MG capsule, Take 1 capsule by mouth 2 (Two) Times a Day., Disp: 180 capsule, Rfl: 0  •  venlafaxine XR (EFFEXOR-XR) 150 MG 24 hr capsule, Take 1 capsule by mouth Daily., Disp: 90 capsule, Rfl: 1  •  ibuprofen (ADVIL,MOTRIN) 800 MG tablet, Take 1 tablet by mouth Every 6 (Six) Hours As Needed for Headache., Disp: 120 tablet, Rfl: 5  •  methocarbamol (ROBAXIN) 500 MG tablet, Take 1 tablet by mouth 3 (Three) Times a Day., Disp: 30 tablet, Rfl: 1      Review of Systems   Constitutional: Negative.    Eyes: Positive for photophobia and visual disturbance (blurred vision sometimes).   Respiratory: Negative.    Cardiovascular: Negative.    Gastrointestinal:  "Negative for nausea and vomiting.   Skin: Negative.    Neurological: Positive for headaches. Negative for dizziness, tremors, speech difficulty, weakness and light-headedness.         Objective   Vitals:    05/30/18 1120   BP: 102/78   BP Location: Left arm   Patient Position: Sitting   Cuff Size: Small Adult   Pulse: 79   Temp: 98.3 °F (36.8 °C)   TempSrc: Oral   SpO2: 94%   Weight: 54.2 kg (119 lb 6.4 oz)   Height: 165.1 cm (65\")     Physical Exam   Constitutional: She is oriented to person, place, and time. She appears well-developed and well-nourished.   HENT:   Right Ear: External ear normal.   Left Ear: External ear normal.   Nose: Nose normal.   Mouth/Throat: Oropharynx is clear and moist.   Eyes: Conjunctivae and EOM are normal. Pupils are equal, round, and reactive to light.   Neck: Normal range of motion. Neck supple.   Cardiovascular: Normal rate, regular rhythm and normal heart sounds.    Pulmonary/Chest: Breath sounds normal.   Lymphadenopathy:     She has no cervical adenopathy.   Neurological: She is alert and oriented to person, place, and time.   Skin: Skin is warm and dry.   Psychiatric:   No acute distress   Vitals reviewed.            Xochilt was seen today for headache.    Diagnoses and all orders for this visit:    Mixed headache  -     methocarbamol (ROBAXIN) 500 MG tablet; Take 1 tablet by mouth 3 (Three) Times a Day.  -     ibuprofen (ADVIL,MOTRIN) 800 MG tablet; Take 1 tablet by mouth Every 6 (Six) Hours As Needed for Headache.    1. I will start her on Robaxin 500 mg three times a day as needed and also Ibuprofen 800 mg every 6 hours as needed.   2. She has been instructed to stop the Meloxicam while taking the Ibuprofen and also stop taking Excedrin migraine.  3. She is to follow up if her symptoms worsen.   "

## 2018-06-12 RX ORDER — VENLAFAXINE HYDROCHLORIDE 150 MG/1
CAPSULE, EXTENDED RELEASE ORAL
Qty: 90 CAPSULE | Refills: 0 | Status: SHIPPED | OUTPATIENT
Start: 2018-06-12 | End: 2022-04-05 | Stop reason: SDUPTHER

## 2018-06-12 RX ORDER — MELOXICAM 15 MG/1
TABLET ORAL
Qty: 90 TABLET | Refills: 0 | Status: SHIPPED | OUTPATIENT
Start: 2018-06-12 | End: 2018-11-28 | Stop reason: SDUPTHER

## 2018-11-28 RX ORDER — MELOXICAM 15 MG/1
TABLET ORAL
Qty: 90 TABLET | Refills: 0 | Status: SHIPPED | OUTPATIENT
Start: 2018-11-28 | End: 2022-04-05

## 2019-01-23 ENCOUNTER — OFFICE VISIT (OUTPATIENT)
Dept: OBSTETRICS AND GYNECOLOGY | Age: 44
End: 2019-01-23

## 2019-01-23 ENCOUNTER — PROCEDURE VISIT (OUTPATIENT)
Dept: OBSTETRICS AND GYNECOLOGY | Age: 44
End: 2019-01-23

## 2019-01-23 ENCOUNTER — APPOINTMENT (OUTPATIENT)
Dept: WOMENS IMAGING | Facility: HOSPITAL | Age: 44
End: 2019-01-23

## 2019-01-23 VITALS
DIASTOLIC BLOOD PRESSURE: 68 MMHG | HEIGHT: 65 IN | WEIGHT: 125 LBS | SYSTOLIC BLOOD PRESSURE: 98 MMHG | BODY MASS INDEX: 20.83 KG/M2

## 2019-01-23 DIAGNOSIS — Z87.410 HISTORY OF CERVICAL DYSPLASIA: ICD-10-CM

## 2019-01-23 DIAGNOSIS — Z11.51 SCREENING FOR HPV (HUMAN PAPILLOMAVIRUS): ICD-10-CM

## 2019-01-23 DIAGNOSIS — Z13.89 SCREENING FOR HEMATURIA OR PROTEINURIA: ICD-10-CM

## 2019-01-23 DIAGNOSIS — Z12.31 VISIT FOR SCREENING MAMMOGRAM: ICD-10-CM

## 2019-01-23 DIAGNOSIS — Z01.419 WELL WOMAN EXAM WITH ROUTINE GYNECOLOGICAL EXAM: Primary | ICD-10-CM

## 2019-01-23 DIAGNOSIS — Z12.31 VISIT FOR SCREENING MAMMOGRAM: Primary | ICD-10-CM

## 2019-01-23 LAB
BILIRUB BLD-MCNC: NEGATIVE MG/DL
CLARITY, POC: CLEAR
COLOR UR: YELLOW
GLUCOSE UR STRIP-MCNC: NEGATIVE MG/DL
KETONES UR QL: NEGATIVE
LEUKOCYTE EST, POC: NEGATIVE
NITRITE UR-MCNC: NEGATIVE MG/ML
PH UR: 7 [PH] (ref 5–8)
PROT UR STRIP-MCNC: NEGATIVE MG/DL
RBC # UR STRIP: NEGATIVE /UL
SP GR UR: 1.02 (ref 1–1.03)
UROBILINOGEN UR QL: NORMAL

## 2019-01-23 PROCEDURE — 99396 PREV VISIT EST AGE 40-64: CPT | Performed by: OBSTETRICS & GYNECOLOGY

## 2019-01-23 PROCEDURE — 81002 URINALYSIS NONAUTO W/O SCOPE: CPT | Performed by: OBSTETRICS & GYNECOLOGY

## 2019-01-23 PROCEDURE — 77067 SCR MAMMO BI INCL CAD: CPT | Performed by: RADIOLOGY

## 2019-01-23 NOTE — PROGRESS NOTES
Routine Annual Visit    2019    Patient: Xochilt Alfonso          MR#:[id number]    Chief Complaint   Patient presents with   • Gynecologic Exam     AE and MG today.  2017, neg PAP (yearly paps due to hx of JUAN ALBERTO-1, JUAN ALBERTO-2).  Fam hx of cervical cancer.   Contraception = TUBAL.  Abnormal labs in 2018. Symptoms of menopause, hot flashes, had two cyles in December. Father passed away in Dec. 2017 and she was seperated from her  for nine months.        43 y.o. female  who presents for annual exam.     HISTORY OF PRESENT ILLNESS:    GYN Complaints: More hot flashes and NS, but still having cycles, usually q 28 days, although the last one was a 26 day cycle.      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  Not using pads.  We discussed Kegel's exercises.    Other Complaints: None.    GYN HISTORY:    1.  Menstrual Hx: As above.               Current contraception: tubal ligation    2.  History of abnormal Pap smear: YES,   ,CIS. , LLETZ= JUAN ALBERTO-2 and 3. She will always have yearly Pap smears.   , Neg Pap.    , Neg Pap, Pos HR-HPV, Neg Genotypes 16 and 18.   Mar, 2014, ASCUS, Pos HR-HPV.   Mar 27, 2015, ASCUS, Neg HR-HPV.    May 27, 2016, Neg Pap.    2017, Neg Pap.           PAST MEDICAL HISTORY:       has a current medication list which includes the following prescription(s): cetirizine-pseudoephedrine, fluticasone, meloxicam, and venlafaxine xr.    3.  New Medical Hx:  None.        Past Medical History:   Diagnosis Date   • Acute sinusitis    • Bronchitis    • CIS (carcinoma in situ of cervix) 2000,CIS. , LLETZ= JUAN ALBERTO-2 and 3.   • Depression 2016   • Dysmenorrhea     More back pain.   • Dyspareunia in female     Doesn't know, currently not having coitus.   • Fatigue    • History of Papanicolaou smear of cervix 2017,CIS. , LLETZ= JUAN ALBERTO-2 and 3. She will always have yearly Pap smears. , Neg Pap.  , Neg Pap, Pos HR-HPV, Neg Genotypes 16  and 18. Mar, 2014, ASCUS, Pos HR-HPV. Mar 27, 2015, ASCUS, Neg HR-HPV.  May 27, 2016, Neg Pap.  2017, Neg Pap.   • Hot flashes    • Menorrhagia     First couple of days.   • Pelvic pain    • Pituitary microadenoma (CMS/HCC)    • Post partum depression    • Prolactinoma (CMS/HCC)    • Psoriasis 2017   • Varicella        4.  New Surgical Hx:  None.        Past Surgical History:   Procedure Laterality Date   • BREAST AUGMENTATION      Dr. Minor French   • CERVICAL BIOPSY  W/ LOOP ELECTRODE EXCISION      LLETZ of the cervix for CIS on bx.  Path showed JUAN ALBERTO-2 and JUAN ALBERTO-3.     • DILATATION AND CURETTAGE      SAB, blighted ovum   • POSTPARTUM TUBAL LIGATION  2015   • WISDOM TOOTH EXTRACTION  2005        5.  New Family Medical Hx: Father  in , alcoholism, . Probable AMI.        Family History   Problem Relation Age of Onset   • Heart disease Maternal Grandmother         Rheumatic fever   • Lung cancer Maternal Grandfather    • Alcohol abuse Maternal Grandfather    • Colon cancer Paternal Grandfather    • Alcohol abuse Paternal Grandfather    • Arthritis Mother    • Depression Mother    • Cervical cancer Mother    • Alcohol abuse Father    • Arthritis Father    • Depression Father    • Hyperlipidemia Father    • Hypertension Father    • No Known Problems Daughter    • No Known Problems Daughter    • No Known Problems Daughter    • No Known Problems Daughter    • No Known Problems Daughter    • No Known Problems Brother    • Osteoporosis Paternal Grandmother    • Arthritis Paternal Grandmother    • Diabetes Maternal Aunt    • No Known Problems Paternal Aunt    • No Known Problems Brother    • BRCA 1/2 Neg Hx    • Breast cancer Neg Hx    • Endometrial cancer Neg Hx    • Ovarian cancer Neg Hx        6.  Social History     Tobacco Use   Smoking Status Former Smoker   • Packs/day: 1.00   • Years: 20.00   • Pack years: 20.00   • Types: Cigarettes   • Last attempt to quit:   "  • Years since quittin.0   Smokeless Tobacco Never Used         Review of Systems   Constitutional: Positive for fatigue.   HENT: Positive for sinus pressure.    Endocrine:        Hot flashes.   Genitourinary:        Slight stress incontinence.  No pads.   Musculoskeletal: Positive for arthralgias.   Allergic/Immunologic: Positive for environmental allergies (seasonal allergies.).   Neurological: Positive for headaches (sounds like tension headaches.).   Psychiatric/Behavioral: Positive for dysphoric mood.   All other systems reviewed and are negative.        SCREENINGS:  =Family history of breast cancer: no  =Family history of ovarian cancer: no  =Family history of uterine cancer: Mom had cervical cancer.  =Family History of colon cancer:PGF in his 50's.    Perform regular self breast exam: yes - Most months.   Breast self-examination technique  reviewed and the patient encouraged to perform self breast exams monthly.     Mammogram: done today.  Colonoscopy: not indicated.  DEXA: not indicated.      LIFESTYLE:  =Diet: Healthy     =Exercise:  yes - HIKES.   =I recommended 30 minutes of aerobic exercise 5 times a week.     =Calcium:  no.  =Vitamin D:  no.   = We discussed calcium intake needs to help prevent osteoporosis:      Recommended 600 mg of calcium daily and 1000 IUs of vitamin D 3 daily.        Objective     BP 98/68   Ht 165.1 cm (65\")   Wt 56.7 kg (125 lb)   LMP 2018   Breastfeeding? No   BMI 20.80 kg/m²     PHYSICAL EXAM    OBGyn Exam    Constitutional: Well-developed, well-nourished, thin  female, in no distress, alert and well oriented ×3.    Skin is warm, dry, without rash.       Neck: Normal, trachea in the midline.  Thyroid exam normal. No cervical lymphadenopathy.    Back: Normal.  No CVA tenderness.    Lungs: Clear to auscultation.  Chest wall appears normal.    Heart:: Regular Rhythm without murmur or gallop.    Breasts: Bilateral saline implants.        Right breast " nontender, without dominant mass.  No nipple discharge.            No superficial skin changes.  No axillary adenopathy.        Left breast nontender, without dominant mass.  No nipple discharge.            No superficial skin changes.  No axillary adenopathy.      Abdomen: Flat, soft and nontender.No palpable mass.           No hepatosplenomegaly.  Flanks are negative.          Bowel sounds normal.  No abdominal bruit.          No inguinal lymphadenopathy bilaterally.     Pelvic exam :  Vulva normal.           External genitalia normal.  BUS negative.             Introitus normal.  Vaginal mucosa normal.  Well estrogenized.           Cervix normal, Pap with HPV.  No cervical motion tenderness.          Uterus retroverted, normal size, normal shape, and position. Not tender.          Adnexa are negative bilaterally.  No tenderness.  No palpable mass.          Rectovaginal exam negative.    Musc /Skel: Lower extremities without edema.     Neuro: Coordination is grossly normal.  Gait is normal.    Psych: Mood and affect is normal.  Behavior normal.  Thought content normal.            Judgment normal.       =All questions were answered.      Assessment/Plan   Xochilt was seen today for gynecologic exam.    Diagnoses and all orders for this visit:    Well woman exam with routine gynecological exam  -     PapIG, HPV, Rfx 16 / 18    Visit for screening mammogram    Screening for hematuria or proteinuria  -     POC Urinalysis Dipstick    Screening for HPV (human papillomavirus)  -     PapIG, HPV, Rfx 16 / 18    History of cervical dysplasia  -     PapIG, HPV, Rfx 16 / 18      COMMENTS: Normal GYN exam.  History of severe dysplasia in 2000, will always have yearly Paps.  Mammogram today.  Would like to follow up with Dr. Díaz in a year.    Awais Edge MD  1/27/2019

## 2019-01-25 LAB
CYTOLOGIST CVX/VAG CYTO: NORMAL
CYTOLOGY CVX/VAG DOC THIN PREP: NORMAL
DX ICD CODE: NORMAL
HIV 1 & 2 AB SER-IMP: NORMAL
HPV I/H RISK 1 DNA CVX QL PROBE+SIG AMP: NEGATIVE
OTHER STN SPEC: NORMAL
PATH REPORT.FINAL DX SPEC: NORMAL
STAT OF ADQ CVX/VAG CYTO-IMP: NORMAL

## 2019-01-28 ENCOUNTER — TELEPHONE (OUTPATIENT)
Dept: OBSTETRICS AND GYNECOLOGY | Age: 44
End: 2019-01-28

## 2019-01-28 NOTE — PROGRESS NOTES
Normal Mammogram. Breasts with scattered areas of fibroglandular density.  Bilateral retropectoral saline implants.  No suspicious findings or significant change.  Normal implants.  BI-RADS 1. Repeat in one year.

## 2019-01-28 NOTE — TELEPHONE ENCOUNTER
----- Message from Awais Edge MD sent at 1/28/2019  9:53 AM EST -----  Notify that the Pap smear was negative and the high risk HPV testing was negative.

## 2019-02-08 RX ORDER — MELOXICAM 15 MG/1
TABLET ORAL
Qty: 90 TABLET | Refills: 0 | OUTPATIENT
Start: 2019-02-08

## 2020-03-30 ENCOUNTER — APPOINTMENT (OUTPATIENT)
Dept: GENERAL RADIOLOGY | Facility: HOSPITAL | Age: 45
End: 2020-03-30

## 2020-03-30 ENCOUNTER — APPOINTMENT (OUTPATIENT)
Dept: CT IMAGING | Facility: HOSPITAL | Age: 45
End: 2020-03-30

## 2020-03-30 ENCOUNTER — HOSPITAL ENCOUNTER (EMERGENCY)
Facility: HOSPITAL | Age: 45
Discharge: HOME | End: 2020-03-30
Attending: EMERGENCY MEDICINE | Admitting: EMERGENCY MEDICINE

## 2020-03-30 VITALS
HEART RATE: 93 BPM | RESPIRATION RATE: 16 BRPM | DIASTOLIC BLOOD PRESSURE: 70 MMHG | SYSTOLIC BLOOD PRESSURE: 110 MMHG | TEMPERATURE: 101.6 F | BODY MASS INDEX: 21.33 KG/M2 | OXYGEN SATURATION: 96 % | WEIGHT: 128 LBS | HEIGHT: 65 IN

## 2020-03-30 DIAGNOSIS — N39.0 ACUTE UTI: Primary | ICD-10-CM

## 2020-03-30 DIAGNOSIS — Z20.822 SUSPECTED COVID-19 VIRUS INFECTION: ICD-10-CM

## 2020-03-30 LAB
ALBUMIN SERPL-MCNC: 4.3 G/DL (ref 3.5–5.2)
ALBUMIN/GLOB SERPL: 1.6 G/DL
ALP SERPL-CCNC: 66 U/L (ref 39–117)
ALT SERPL W P-5'-P-CCNC: 11 U/L (ref 1–33)
ANION GAP SERPL CALCULATED.3IONS-SCNC: 14.1 MMOL/L (ref 5–15)
AST SERPL-CCNC: 11 U/L (ref 1–32)
B PARAPERT DNA SPEC QL NAA+PROBE: NOT DETECTED
B PERT DNA SPEC QL NAA+PROBE: NOT DETECTED
BACTERIA UR QL AUTO: ABNORMAL /HPF
BASOPHILS # BLD AUTO: 0.04 10*3/MM3 (ref 0–0.2)
BASOPHILS NFR BLD AUTO: 0.5 % (ref 0–1.5)
BILIRUB SERPL-MCNC: 0.4 MG/DL (ref 0.2–1.2)
BILIRUB UR QL STRIP: NEGATIVE
BUN BLD-MCNC: 11 MG/DL (ref 6–20)
BUN/CREAT SERPL: 15.9 (ref 7–25)
C PNEUM DNA NPH QL NAA+NON-PROBE: NOT DETECTED
CALCIUM SPEC-SCNC: 8.8 MG/DL (ref 8.6–10.5)
CHLORIDE SERPL-SCNC: 96 MMOL/L (ref 98–107)
CLARITY UR: ABNORMAL
CO2 SERPL-SCNC: 21.9 MMOL/L (ref 22–29)
COLOR UR: YELLOW
CREAT BLD-MCNC: 0.69 MG/DL (ref 0.57–1)
D-LACTATE SERPL-SCNC: 0.8 MMOL/L (ref 0.5–2)
DEPRECATED RDW RBC AUTO: 41 FL (ref 37–54)
EOSINOPHIL # BLD AUTO: 0.05 10*3/MM3 (ref 0–0.4)
EOSINOPHIL NFR BLD AUTO: 0.6 % (ref 0.3–6.2)
ERYTHROCYTE [DISTWIDTH] IN BLOOD BY AUTOMATED COUNT: 12.2 % (ref 12.3–15.4)
FLUAV H1 2009 PAND RNA NPH QL NAA+PROBE: NOT DETECTED
FLUAV H1 HA GENE NPH QL NAA+PROBE: NOT DETECTED
FLUAV H3 RNA NPH QL NAA+PROBE: NOT DETECTED
FLUAV SUBTYP SPEC NAA+PROBE: NOT DETECTED
FLUBV RNA ISLT QL NAA+PROBE: NOT DETECTED
GFR SERPL CREATININE-BSD FRML MDRD: 92 ML/MIN/1.73
GLOBULIN UR ELPH-MCNC: 2.7 GM/DL
GLUCOSE BLD-MCNC: 99 MG/DL (ref 65–99)
GLUCOSE UR STRIP-MCNC: NEGATIVE MG/DL
HADV DNA SPEC NAA+PROBE: NOT DETECTED
HCG SERPL QL: NEGATIVE
HCOV 229E RNA SPEC QL NAA+PROBE: NOT DETECTED
HCOV HKU1 RNA SPEC QL NAA+PROBE: NOT DETECTED
HCOV NL63 RNA SPEC QL NAA+PROBE: NOT DETECTED
HCOV OC43 RNA SPEC QL NAA+PROBE: NOT DETECTED
HCT VFR BLD AUTO: 41.5 % (ref 34–46.6)
HGB BLD-MCNC: 14.1 G/DL (ref 12–15.9)
HGB UR QL STRIP.AUTO: ABNORMAL
HMPV RNA NPH QL NAA+NON-PROBE: NOT DETECTED
HPIV1 RNA SPEC QL NAA+PROBE: NOT DETECTED
HPIV2 RNA SPEC QL NAA+PROBE: NOT DETECTED
HPIV3 RNA NPH QL NAA+PROBE: NOT DETECTED
HPIV4 P GENE NPH QL NAA+PROBE: NOT DETECTED
HYALINE CASTS UR QL AUTO: ABNORMAL /LPF
IMM GRANULOCYTES # BLD AUTO: 0.03 10*3/MM3 (ref 0–0.05)
IMM GRANULOCYTES NFR BLD AUTO: 0.4 % (ref 0–0.5)
KETONES UR QL STRIP: ABNORMAL
LEUKOCYTE ESTERASE UR QL STRIP.AUTO: ABNORMAL
LIPASE SERPL-CCNC: 12 U/L (ref 13–60)
LYMPHOCYTES # BLD AUTO: 0.37 10*3/MM3 (ref 0.7–3.1)
LYMPHOCYTES NFR BLD AUTO: 4.5 % (ref 19.6–45.3)
M PNEUMO IGG SER IA-ACNC: NOT DETECTED
MCH RBC QN AUTO: 31.1 PG (ref 26.6–33)
MCHC RBC AUTO-ENTMCNC: 34 G/DL (ref 31.5–35.7)
MCV RBC AUTO: 91.4 FL (ref 79–97)
MONOCYTES # BLD AUTO: 0.7 10*3/MM3 (ref 0.1–0.9)
MONOCYTES NFR BLD AUTO: 8.5 % (ref 5–12)
NEUTROPHILS # BLD AUTO: 7.01 10*3/MM3 (ref 1.7–7)
NEUTROPHILS NFR BLD AUTO: 85.5 % (ref 42.7–76)
NITRITE UR QL STRIP: POSITIVE
NRBC BLD AUTO-RTO: 0 /100 WBC (ref 0–0.2)
PH UR STRIP.AUTO: 6 [PH] (ref 5–8)
PLATELET # BLD AUTO: 181 10*3/MM3 (ref 140–450)
PMV BLD AUTO: 9.6 FL (ref 6–12)
POTASSIUM BLD-SCNC: 3.8 MMOL/L (ref 3.5–5.2)
PROCALCITONIN SERPL-MCNC: 0.92 NG/ML (ref 0.1–0.25)
PROT SERPL-MCNC: 7 G/DL (ref 6–8.5)
PROT UR QL STRIP: ABNORMAL
RBC # BLD AUTO: 4.54 10*6/MM3 (ref 3.77–5.28)
RBC # UR: ABNORMAL /HPF
REF LAB TEST METHOD: ABNORMAL
RHINOVIRUS RNA SPEC NAA+PROBE: NOT DETECTED
RSV RNA NPH QL NAA+NON-PROBE: NOT DETECTED
SODIUM BLD-SCNC: 132 MMOL/L (ref 136–145)
SP GR UR STRIP: 1.01 (ref 1–1.03)
SQUAMOUS #/AREA URNS HPF: ABNORMAL /HPF
UROBILINOGEN UR QL STRIP: ABNORMAL
WBC NRBC COR # BLD: 8.2 10*3/MM3 (ref 3.4–10.8)
WBC UR QL AUTO: ABNORMAL /HPF

## 2020-03-30 PROCEDURE — 87086 URINE CULTURE/COLONY COUNT: CPT | Performed by: EMERGENCY MEDICINE

## 2020-03-30 PROCEDURE — 87186 SC STD MICRODIL/AGAR DIL: CPT | Performed by: EMERGENCY MEDICINE

## 2020-03-30 PROCEDURE — 83605 ASSAY OF LACTIC ACID: CPT | Performed by: EMERGENCY MEDICINE

## 2020-03-30 PROCEDURE — 0100U HC BIOFIRE FILMARRAY RESP PANEL 2: CPT | Performed by: EMERGENCY MEDICINE

## 2020-03-30 PROCEDURE — 84145 PROCALCITONIN (PCT): CPT | Performed by: EMERGENCY MEDICINE

## 2020-03-30 PROCEDURE — 83690 ASSAY OF LIPASE: CPT | Performed by: EMERGENCY MEDICINE

## 2020-03-30 PROCEDURE — 25010000002 KETOROLAC TROMETHAMINE PER 15 MG: Performed by: EMERGENCY MEDICINE

## 2020-03-30 PROCEDURE — 85025 COMPLETE CBC W/AUTO DIFF WBC: CPT | Performed by: EMERGENCY MEDICINE

## 2020-03-30 PROCEDURE — 87635 SARS-COV-2 COVID-19 AMP PRB: CPT | Performed by: EMERGENCY MEDICINE

## 2020-03-30 PROCEDURE — 71045 X-RAY EXAM CHEST 1 VIEW: CPT

## 2020-03-30 PROCEDURE — 99284 EMERGENCY DEPT VISIT MOD MDM: CPT

## 2020-03-30 PROCEDURE — 25010000002 CEFTRIAXONE PER 250 MG: Performed by: EMERGENCY MEDICINE

## 2020-03-30 PROCEDURE — U0003 INFECTIOUS AGENT DETECTION BY NUCLEIC ACID (DNA OR RNA); SEVERE ACUTE RESPIRATORY SYNDROME CORONAVIRUS 2 (SARS-COV-2) (CORONAVIRUS DISEASE [COVID-19]), AMPLIFIED PROBE TECHNIQUE, MAKING USE OF HIGH THROUGHPUT TECHNOLOGIES AS DESCRIBED BY CMS-2020-01-R: HCPCS | Performed by: EMERGENCY MEDICINE

## 2020-03-30 PROCEDURE — 87150 DNA/RNA AMPLIFIED PROBE: CPT | Performed by: EMERGENCY MEDICINE

## 2020-03-30 PROCEDURE — 96365 THER/PROPH/DIAG IV INF INIT: CPT

## 2020-03-30 PROCEDURE — 81001 URINALYSIS AUTO W/SCOPE: CPT | Performed by: EMERGENCY MEDICINE

## 2020-03-30 PROCEDURE — 84703 CHORIONIC GONADOTROPIN ASSAY: CPT | Performed by: EMERGENCY MEDICINE

## 2020-03-30 PROCEDURE — 96375 TX/PRO/DX INJ NEW DRUG ADDON: CPT

## 2020-03-30 PROCEDURE — 87040 BLOOD CULTURE FOR BACTERIA: CPT | Performed by: EMERGENCY MEDICINE

## 2020-03-30 PROCEDURE — 74176 CT ABD & PELVIS W/O CONTRAST: CPT

## 2020-03-30 PROCEDURE — 80053 COMPREHEN METABOLIC PANEL: CPT | Performed by: EMERGENCY MEDICINE

## 2020-03-30 RX ORDER — CEFTRIAXONE SODIUM 1 G/50ML
1 INJECTION, SOLUTION INTRAVENOUS ONCE
Status: COMPLETED | OUTPATIENT
Start: 2020-03-30 | End: 2020-03-30

## 2020-03-30 RX ORDER — CIPROFLOXACIN 500 MG/1
500 TABLET, FILM COATED ORAL 2 TIMES DAILY
Qty: 20 TABLET | Refills: 0 | Status: SHIPPED | OUTPATIENT
Start: 2020-03-30 | End: 2022-04-05

## 2020-03-30 RX ORDER — KETOROLAC TROMETHAMINE 15 MG/ML
15 INJECTION, SOLUTION INTRAMUSCULAR; INTRAVENOUS ONCE
Status: COMPLETED | OUTPATIENT
Start: 2020-03-30 | End: 2020-03-30

## 2020-03-30 RX ORDER — ACETAMINOPHEN 500 MG
1000 TABLET ORAL ONCE
Status: COMPLETED | OUTPATIENT
Start: 2020-03-30 | End: 2020-03-30

## 2020-03-30 RX ORDER — ONDANSETRON 4 MG/1
4 TABLET, FILM COATED ORAL EVERY 6 HOURS PRN
Qty: 10 TABLET | Refills: 0 | Status: SHIPPED | OUTPATIENT
Start: 2020-03-30 | End: 2022-04-05

## 2020-03-30 RX ADMIN — KETOROLAC TROMETHAMINE 15 MG: 15 INJECTION, SOLUTION INTRAMUSCULAR; INTRAVENOUS at 16:00

## 2020-03-30 RX ADMIN — SODIUM CHLORIDE 1000 ML: 9 INJECTION, SOLUTION INTRAVENOUS at 16:00

## 2020-03-30 RX ADMIN — CEFTRIAXONE SODIUM 1 G: 1 INJECTION, SOLUTION INTRAVENOUS at 17:21

## 2020-03-30 RX ADMIN — ACETAMINOPHEN 1000 MG: 500 TABLET, FILM COATED ORAL at 15:42

## 2020-03-31 LAB
BACTERIA BLD CULT: ABNORMAL
BOTTLE TYPE: ABNORMAL

## 2020-04-01 LAB — BACTERIA SPEC AEROBE CULT: ABNORMAL

## 2020-04-02 LAB
BACTERIA SPEC AEROBE CULT: ABNORMAL
GRAM STN SPEC: ABNORMAL
GRAM STN SPEC: ABNORMAL
ISOLATED FROM: ABNORMAL

## 2020-04-04 LAB
BACTERIA SPEC AEROBE CULT: NORMAL
SARS-COV-2 RNA RESP QL NAA+PROBE: NOT DETECTED

## 2021-03-30 ENCOUNTER — APPOINTMENT (OUTPATIENT)
Dept: WOMENS IMAGING | Facility: HOSPITAL | Age: 46
End: 2021-03-30

## 2021-03-30 ENCOUNTER — PROCEDURE VISIT (OUTPATIENT)
Dept: OBSTETRICS AND GYNECOLOGY | Age: 46
End: 2021-03-30

## 2021-03-30 ENCOUNTER — OFFICE VISIT (OUTPATIENT)
Dept: OBSTETRICS AND GYNECOLOGY | Age: 46
End: 2021-03-30

## 2021-03-30 VITALS
BODY MASS INDEX: 19.46 KG/M2 | DIASTOLIC BLOOD PRESSURE: 60 MMHG | SYSTOLIC BLOOD PRESSURE: 100 MMHG | WEIGHT: 116.8 LBS | HEIGHT: 65 IN

## 2021-03-30 DIAGNOSIS — Z12.31 VISIT FOR SCREENING MAMMOGRAM: Primary | ICD-10-CM

## 2021-03-30 DIAGNOSIS — Z12.11 SCREENING FOR COLON CANCER: ICD-10-CM

## 2021-03-30 DIAGNOSIS — Z01.419 WELL WOMAN EXAM WITH ROUTINE GYNECOLOGICAL EXAM: Primary | ICD-10-CM

## 2021-03-30 PROCEDURE — 77067 SCR MAMMO BI INCL CAD: CPT | Performed by: RADIOLOGY

## 2021-03-30 PROCEDURE — 77067 SCR MAMMO BI INCL CAD: CPT | Performed by: NURSE PRACTITIONER

## 2021-03-30 PROCEDURE — 99396 PREV VISIT EST AGE 40-64: CPT | Performed by: NURSE PRACTITIONER

## 2021-03-30 RX ORDER — CETIRIZINE HYDROCHLORIDE 10 MG/1
10 TABLET ORAL DAILY
COMMUNITY
End: 2022-04-05

## 2021-03-30 NOTE — PROGRESS NOTES
"[facility]  Routine Annual Visit    3/30/2021    Patient: Xochilt Alfonso          MR#:[id number]      History of Present Illness    46 y.o. female  who presents for annual exam.    She is a former patient of Dr. Edge  Last pap negative, HPV negative 2019  Mammogram negative 2019- she is scheduled today for screening- no breast complaints   Using tubal ligation for contraception  Her periods continue to be monthly and normal- no changes or concerns   She has noticed some heat intolerance - will check FSH and TSH  Discussed screening colonoscopy and Xochilt desires this- colon cancer in GF at age 61 and polyps in father and uncle  Encouraged yearly physicals and cholesterol screening     No complaints today.    Patient's last menstrual period was 2021 (exact date).  Obstetric History:  OB History        5    Para   4    Term   3       1    AB   1    Living   4       SAB   1    TAB   0    Ectopic   0    Molar        Multiple   0    Live Births   4          Obstetric Comments   One step child \"DELFINO\", one adopted daughter, \"VANESA\"            Menstrual History:     Patient's last menstrual period was 2021 (exact date).       Sexual History:       ________________________________________  Patient Active Problem List   Diagnosis   • Depression   • Neck pain   • Psoriasis       Past Medical History:   Diagnosis Date   • Acute sinusitis    • Bronchitis    • CIS (carcinoma in situ of cervix) 2000,CIS. , LLETZ= JUAN ALBERTO-2 and 3.   • Depression 2016   • Dysmenorrhea     More back pain.   • Dyspareunia in female     Doesn't know, currently not having coitus.   • Fatigue    • History of Papanicolaou smear of cervix 2017,CIS. , LLETZ= JUAN ALBERTO-2 and 3. She will always have yearly Pap smears. , Neg Pap.  , Neg Pap, Pos HR-HPV, Neg Genotypes 16 and 18. Mar, 2014, ASCUS, Pos HR-HPV. Mar 27, 2015, ASCUS, Neg HR-HPV.  May 27, 2016, Neg Pap. "  2017, Neg Pap.   • Hot flashes    • Menorrhagia     First couple of days.   • Pelvic pain    • Pituitary microadenoma (CMS/HCC)    • Post partum depression    • Prolactinoma (CMS/HCC)    • Psoriasis 2017   • Varicella        Past Surgical History:   Procedure Laterality Date   • BREAST AUGMENTATION      Dr. Minor French   • CERVICAL BIOPSY  W/ LOOP ELECTRODE EXCISION      LLETZ of the cervix for CIS on bx.  Path showed JUAN ALBERTO-2 and JUAN ALBERTO-3.     • DILATATION AND CURETTAGE      SAB, blighted ovum   • POSTPARTUM TUBAL LIGATION  2015   • WISDOM TOOTH EXTRACTION  2005        Social History     Tobacco Use   Smoking Status Former Smoker   • Packs/day: 1.00   • Years: 20.00   • Pack years: 20.00   • Types: Cigarettes   • Quit date:    • Years since quittin.2   Smokeless Tobacco Never Used       Family History   Problem Relation Age of Onset   • Heart disease Maternal Grandmother         Rheumatic fever   • Lung cancer Maternal Grandfather    • Alcohol abuse Maternal Grandfather    • Colon cancer Paternal Grandfather    • Alcohol abuse Paternal Grandfather    • Arthritis Mother    • Depression Mother    • Cervical cancer Mother    • Alcohol abuse Father    • Arthritis Father    • Depression Father    • Hyperlipidemia Father    • Hypertension Father    • No Known Problems Daughter    • No Known Problems Daughter    • No Known Problems Daughter    • No Known Problems Daughter    • No Known Problems Daughter    • No Known Problems Brother    • Osteoporosis Paternal Grandmother    • Arthritis Paternal Grandmother    • Diabetes Maternal Aunt    • No Known Problems Paternal Aunt    • No Known Problems Brother    • BRCA 1/2 Neg Hx    • Breast cancer Neg Hx    • Endometrial cancer Neg Hx    • Ovarian cancer Neg Hx        Prior to Admission medications    Medication Sig Start Date End Date Taking? Authorizing Provider   cetirizine (zyrTEC) 10 MG tablet Take 10 mg by mouth Daily.    Yes Mansi Kearney MD   fluticasone (FLONASE) 50 MCG/ACT nasal spray 2 sprays into each nostril Daily.   Yes Mansi Kearney MD   venlafaxine XR (EFFEXOR-XR) 150 MG 24 hr capsule TAKE 1 CAPSULE DAILY 6/12/18  Yes Walter Ellis MD   Cetirizine-Pseudoephedrine (ZYRTEC-D PO) Take  by mouth.    ProviderMansi MD   ciprofloxacin (Cipro) 500 MG tablet Take 1 tablet by mouth 2 (Two) Times a Day. 3/30/20   David Bhagat MD   meloxicam (MOBIC) 15 MG tablet TAKE 1 TABLET DAILY (NEEDS TO BE SEEN BEFORE THIS MEDICATION RUNS OUT) 11/28/18   Walter Ellis MD   ondansetron (Zofran) 4 MG tablet Take 1 tablet by mouth Every 6 (Six) Hours As Needed for Nausea or Vomiting. 3/30/20   David Bhagat MD     ________________________________________    The following portions of the patient's history were reviewed and updated as appropriate: allergies, current medications, past family history, past medical history, past social history, past surgical history and problem list.    Review of Systems   Constitutional: Negative.    HENT: Negative.    Eyes: Negative for visual disturbance.   Respiratory: Negative for cough, shortness of breath and wheezing.    Cardiovascular: Negative for chest pain, palpitations and leg swelling.   Gastrointestinal: Negative for abdominal distention, abdominal pain, blood in stool, constipation, diarrhea, nausea and vomiting.   Endocrine: Negative for cold intolerance and heat intolerance.   Genitourinary: Negative for difficulty urinating, dyspareunia, dysuria, frequency, genital sores, hematuria, menstrual problem, pelvic pain, urgency, vaginal bleeding, vaginal discharge and vaginal pain.   Musculoskeletal: Negative.    Skin: Negative.    Neurological: Negative for dizziness, weakness, light-headedness, numbness and headaches.   Hematological: Negative.    Psychiatric/Behavioral: Negative.    Breasts: negative for lumps skin changes, dimpling, swelling, nipple  "changes/discharge bilaterally         Objective   Physical Exam    /60   Ht 165.1 cm (65\")   Wt 53 kg (116 lb 12.8 oz)   LMP 03/21/2021 (Exact Date)   Breastfeeding No   BMI 19.44 kg/m²    BP Readings from Last 3 Encounters:   03/30/21 100/60   03/30/20 110/70   01/23/19 98/68      Wt Readings from Last 3 Encounters:   03/30/21 53 kg (116 lb 12.8 oz)   03/30/20 58.1 kg (128 lb)   01/23/19 56.7 kg (125 lb)        BMI: Estimated body mass index is 19.44 kg/m² as calculated from the following:    Height as of this encounter: 165.1 cm (65\").    Weight as of this encounter: 53 kg (116 lb 12.8 oz).      General:   alert, appears stated age and cooperative   Heart: regular rate and rhythm, S1, S2 normal, no murmur, click, rub or gallop   Lungs: clear to auscultation bilaterally   Abdomen: soft, non-tender, without masses or organomegaly   Breast: inspection negative, no nipple discharge or bleeding, no masses or nodularity palpable and bilateral implants noted   Vulva: External genitalia including bartholin's glands, Urethra, Remington's gland and urethra meatus are normal, Perineum, rectum and anus appear normal  and Bladder appears normal without significant prolapse    Vagina: normal mucosa, normal discharge   Cervix: no cervical motion tenderness and no lesions   Uterus: normal size, mobile, non-tender or normal shape and consistency   Adnexa: no mass, fullness, tenderness     Assessment:    Diagnoses and all orders for this visit:    1. Well woman exam with routine gynecological exam (Primary)  -     IGP, Apt HPV,rfx 16 / 18,45  -     Follicle Stimulating Hormone  -     TSH    2. Screening for colon cancer  -     Ambulatory Referral For Screening Colonoscopy    Other orders  -     Cancel: Mammo screening digital tomosynthesis bilateral w CAD; Future      Healthy lifestyle modifications discussed, counseled on self breast exams and bone health    All of the patient's questions were addressed and answered, I have " encouraged her to call for today's test results if she has not received them within 10 days.  Patient is advised to call with any change in her condition or with any other questions, otherwise return in 12 months for annual examination.      Carrie Dawson, APRN  3/30/2021 10:50 EDT

## 2021-03-31 ENCOUNTER — TELEPHONE (OUTPATIENT)
Dept: OBSTETRICS AND GYNECOLOGY | Age: 46
End: 2021-03-31

## 2021-03-31 LAB
FSH SERPL-ACNC: 6 MIU/ML
TSH SERPL DL<=0.005 MIU/L-ACNC: 3.19 UIU/ML (ref 0.27–4.2)

## 2021-04-01 ENCOUNTER — TELEPHONE (OUTPATIENT)
Dept: OBSTETRICS AND GYNECOLOGY | Age: 46
End: 2021-04-01

## 2021-04-01 LAB
CYTOLOGIST CVX/VAG CYTO: NORMAL
CYTOLOGY CVX/VAG DOC CYTO: NORMAL
CYTOLOGY CVX/VAG DOC THIN PREP: NORMAL
DX ICD CODE: NORMAL
HIV 1 & 2 AB SER-IMP: NORMAL
HPV I/H RISK 4 DNA CVX QL PROBE+SIG AMP: NEGATIVE
OTHER STN SPEC: NORMAL
STAT OF ADQ CVX/VAG CYTO-IMP: NORMAL

## 2021-04-01 NOTE — TELEPHONE ENCOUNTER
----- Message from INDU Yip sent at 3/31/2021  9:48 AM EDT -----  Please notify patient her FSH returned in NOT menopausal range and her thyroid is WNL

## 2021-04-02 ENCOUNTER — DOCUMENTATION (OUTPATIENT)
Dept: OBSTETRICS AND GYNECOLOGY | Age: 46
End: 2021-04-02

## 2021-04-06 ENCOUNTER — BULK ORDERING (OUTPATIENT)
Dept: CASE MANAGEMENT | Facility: OTHER | Age: 46
End: 2021-04-06

## 2021-04-06 DIAGNOSIS — Z23 IMMUNIZATION DUE: ICD-10-CM

## 2022-04-05 ENCOUNTER — PROCEDURE VISIT (OUTPATIENT)
Dept: OBSTETRICS AND GYNECOLOGY | Age: 47
End: 2022-04-05

## 2022-04-05 ENCOUNTER — APPOINTMENT (OUTPATIENT)
Dept: WOMENS IMAGING | Facility: HOSPITAL | Age: 47
End: 2022-04-05

## 2022-04-05 ENCOUNTER — OFFICE VISIT (OUTPATIENT)
Dept: OBSTETRICS AND GYNECOLOGY | Age: 47
End: 2022-04-05

## 2022-04-05 VITALS
SYSTOLIC BLOOD PRESSURE: 106 MMHG | BODY MASS INDEX: 18.16 KG/M2 | HEIGHT: 65 IN | WEIGHT: 109 LBS | DIASTOLIC BLOOD PRESSURE: 60 MMHG

## 2022-04-05 DIAGNOSIS — Z12.4 ENCOUNTER FOR PAPANICOLAOU SMEAR FOR CERVICAL CANCER SCREENING: ICD-10-CM

## 2022-04-05 DIAGNOSIS — Z11.51 SCREENING FOR HPV (HUMAN PAPILLOMAVIRUS): ICD-10-CM

## 2022-04-05 DIAGNOSIS — Z01.419 WELL WOMAN EXAM WITH ROUTINE GYNECOLOGICAL EXAM: Primary | ICD-10-CM

## 2022-04-05 DIAGNOSIS — Z87.42 H/O ABNORMAL CERVICAL PAPANICOLAOU SMEAR: ICD-10-CM

## 2022-04-05 DIAGNOSIS — Z12.31 VISIT FOR SCREENING MAMMOGRAM: Primary | ICD-10-CM

## 2022-04-05 DIAGNOSIS — Z00.00 HEALTHCARE MAINTENANCE: ICD-10-CM

## 2022-04-05 PROCEDURE — 77067 SCR MAMMO BI INCL CAD: CPT | Performed by: RADIOLOGY

## 2022-04-05 PROCEDURE — 77063 BREAST TOMOSYNTHESIS BI: CPT | Performed by: RADIOLOGY

## 2022-04-05 PROCEDURE — 99396 PREV VISIT EST AGE 40-64: CPT | Performed by: PHYSICIAN ASSISTANT

## 2022-04-05 PROCEDURE — 77063 BREAST TOMOSYNTHESIS BI: CPT | Performed by: PHYSICIAN ASSISTANT

## 2022-04-05 PROCEDURE — 77067 SCR MAMMO BI INCL CAD: CPT | Performed by: PHYSICIAN ASSISTANT

## 2022-04-05 RX ORDER — VENLAFAXINE HYDROCHLORIDE 150 MG/1
150 CAPSULE, EXTENDED RELEASE ORAL DAILY
Qty: 90 CAPSULE | Refills: 0 | Status: SHIPPED | OUTPATIENT
Start: 2022-04-05 | End: 2022-06-30

## 2022-04-05 NOTE — PROGRESS NOTES
"Subjective     Chief Complaint   Patient presents with   • Gynecologic Exam     Annual exam last pap 2021 neg/neg, m/g today, would like a refill on anti depression medication till she gets in with pcp       History of Present Illness    Xochilt Alfonso is a 47 y.o.  who presents for annual exam.    Needs new pcp  Agreeable to referral    She is an avid hiker  Doesn't take supplement for calcium, can't tolerate it    Did not do colonoscopy  Insurance won't cover  May change jobs and hopes insurance would cover it    Her menses are regular every 28-30 days, lasting 4-7 days, dysmenorrhea none   Obstetric History:  OB History        5    Para   4    Term   3       1    AB   1    Living   4       SAB   1    IAB   0    Ectopic   0    Molar        Multiple   0    Live Births   4          Obstetric Comments   One step child \"DELFINO\", one adopted daughter, \"VANESA\"            Menstrual History:     Patient's last menstrual period was 2022 (exact date).         Current contraception: tubal ligation  History of abnormal Pap smear: yes - in past  Received Gardasil immunization: no  Perform regular self breast exam: yes - occl  Family history of uterine or ovarian cancer: no  Family History of colon cancer: no  Family history of breast cancer: no    Mammogram: ordered.  Colonoscopy: recommended.  DEXA: not indicated.    Exercise: moderately active  Calcium/Vitamin D: adequate intake    The following portions of the patient's history were reviewed and updated as appropriate: allergies, current medications, past family history, past medical history, past social history, past surgical history and problem list.    Review of Systems   All other systems reviewed and are negative.      Review of Systems   Constitutional: Negative for fatigue.   Respiratory: Negative for shortness of breath.    Gastrointestinal: Negative for abdominal pain.   Genitourinary: Negative for dysuria.   Neurological: " "Negative for headaches.   Psychiatric/Behavioral: Negative for dysphoric mood.         Objective   Physical Exam    /60   Ht 165.1 cm (65\")   Wt 49.4 kg (109 lb)   LMP 03/08/2022 (Exact Date)   Breastfeeding No   BMI 18.14 kg/m²   General:   alert, comfortable and no distress   Heart: regular rate and rhythm   Lungs: clear to auscultation bilaterally   Breast: normal appearance, no masses or tenderness, Inspection negative, No nipple retraction or dimpling, No nipple discharge or bleeding, No axillary or supraclavicular adenopathy, Normal to palpation without dominant masses, positive findings: implants bilaterally   Neck: no adenopathy and no carotid bruit   Abdomen: {normal findings: soft, non-tender   CVA: Not performed today   Pelvis: External genitalia: normal general appearance  Vaginal: normal mucosa without prolapse or lesions  Cervix: normal appearance and thin prep PAP obtained  Adnexa: normal bimanual exam  Uterus: normal single, nontender   Extremities: Not performed today   Neurologic: negative   Psychiatric: Normal affect, judgement, and mood     Assessment/Plan   Diagnoses and all orders for this visit:    1. Well woman exam with routine gynecological exam (Primary)    2. Healthcare maintenance  -     Ambulatory Referral to Family Practice    3. Encounter for Papanicolaou smear for cervical cancer screening  -     IGP, Aptima HPV, Rfx 16 / 18,45    4. Screening for HPV (human papillomavirus)  -     IGP, Aptima HPV, Rfx 16 / 18,45    5. H/O abnormal cervical Papanicolaou smear    Other orders  -     venlafaxine XR (EFFEXOR-XR) 150 MG 24 hr capsule; Take 1 capsule by mouth Daily.  Dispense: 90 capsule; Refill: 0        All questions answered.  Breast self exam technique reviewed and patient encouraged to perform self-exam monthly.  Discussed healthy lifestyle modifications.  Recommended 30 minutes of aerobic exercise five times per week.  Discussed calcium needs to prevent " osteoporosis.    Pap done d/t h/o abnormal  Will do refills  Of anti depressant until able to see pcp, referral sent   Enc good activity and dietary calcium intake

## 2022-04-12 LAB
CYTOLOGIST CVX/VAG CYTO: ABNORMAL
CYTOLOGY CVX/VAG DOC CYTO: ABNORMAL
CYTOLOGY CVX/VAG DOC THIN PREP: ABNORMAL
DX ICD CODE: ABNORMAL
DX ICD CODE: ABNORMAL
HIV 1 & 2 AB SER-IMP: ABNORMAL
HPV I/H RISK 4 DNA CVX QL PROBE+SIG AMP: NEGATIVE
OTHER STN SPEC: ABNORMAL
PATHOLOGIST CVX/VAG CYTO: ABNORMAL
RECOM F/U CVX/VAG CYTO: ABNORMAL
STAT OF ADQ CVX/VAG CYTO-IMP: ABNORMAL

## 2022-04-14 ENCOUNTER — TELEPHONE (OUTPATIENT)
Dept: OBSTETRICS AND GYNECOLOGY | Age: 47
End: 2022-04-14

## 2022-04-14 PROBLEM — R87.612 LGSIL ON PAP SMEAR OF CERVIX: Status: ACTIVE | Noted: 2022-04-14

## 2022-05-05 ENCOUNTER — TELEPHONE (OUTPATIENT)
Dept: OBSTETRICS AND GYNECOLOGY | Age: 47
End: 2022-05-05

## 2022-05-05 NOTE — TELEPHONE ENCOUNTER
Pt.called to cancel her colpo appt.for 5/22/22 due to no insurance.Will call back to r/s.Also,requesting a copy of her T-dap info that was given to her in 2015.Previous  pt. Did not see in epic.Should chart be ordered?

## 2022-06-30 RX ORDER — VENLAFAXINE HYDROCHLORIDE 150 MG/1
CAPSULE, EXTENDED RELEASE ORAL
Qty: 90 CAPSULE | Refills: 0 | Status: SHIPPED | OUTPATIENT
Start: 2022-06-30 | End: 2022-09-26

## 2022-09-26 RX ORDER — VENLAFAXINE HYDROCHLORIDE 150 MG/1
CAPSULE, EXTENDED RELEASE ORAL
Qty: 90 CAPSULE | Refills: 0 | Status: SHIPPED | OUTPATIENT
Start: 2022-09-26 | End: 2022-12-28

## 2022-12-28 RX ORDER — VENLAFAXINE HYDROCHLORIDE 150 MG/1
CAPSULE, EXTENDED RELEASE ORAL
Qty: 90 CAPSULE | Refills: 0 | Status: SHIPPED | OUTPATIENT
Start: 2022-12-28

## 2023-04-13 ENCOUNTER — OFFICE VISIT (OUTPATIENT)
Dept: OBSTETRICS AND GYNECOLOGY | Age: 48
End: 2023-04-13
Payer: COMMERCIAL

## 2023-04-13 ENCOUNTER — PROCEDURE VISIT (OUTPATIENT)
Dept: OBSTETRICS AND GYNECOLOGY | Age: 48
End: 2023-04-13
Payer: COMMERCIAL

## 2023-04-13 VITALS
BODY MASS INDEX: 20.66 KG/M2 | HEIGHT: 65 IN | WEIGHT: 124 LBS | SYSTOLIC BLOOD PRESSURE: 112 MMHG | DIASTOLIC BLOOD PRESSURE: 64 MMHG

## 2023-04-13 DIAGNOSIS — Z11.51 SCREENING FOR HPV (HUMAN PAPILLOMAVIRUS): ICD-10-CM

## 2023-04-13 DIAGNOSIS — Z12.11 SCREENING FOR COLON CANCER: ICD-10-CM

## 2023-04-13 DIAGNOSIS — R87.612 LGSIL ON PAP SMEAR OF CERVIX: ICD-10-CM

## 2023-04-13 DIAGNOSIS — Z01.419 WELL WOMAN EXAM WITH ROUTINE GYNECOLOGICAL EXAM: Primary | ICD-10-CM

## 2023-04-13 DIAGNOSIS — Z12.4 ENCOUNTER FOR PAPANICOLAOU SMEAR FOR CERVICAL CANCER SCREENING: ICD-10-CM

## 2023-04-13 DIAGNOSIS — Z12.31 VISIT FOR SCREENING MAMMOGRAM: Primary | ICD-10-CM

## 2023-04-13 PROCEDURE — 77067 SCR MAMMO BI INCL CAD: CPT | Performed by: PHYSICIAN ASSISTANT

## 2023-04-13 PROCEDURE — 77063 BREAST TOMOSYNTHESIS BI: CPT | Performed by: PHYSICIAN ASSISTANT

## 2023-04-13 RX ORDER — NORETHINDRONE ACETATE AND ETHINYL ESTRADIOL, ETHINYL ESTRADIOL AND FERROUS FUMARATE 1MG-10(24)
1 KIT ORAL DAILY
Qty: 28 TABLET | Refills: 6 | Status: SHIPPED | OUTPATIENT
Start: 2023-04-13 | End: 2023-05-11

## 2023-04-13 RX ORDER — DULOXETIN HYDROCHLORIDE 30 MG/1
CAPSULE, DELAYED RELEASE ORAL
COMMUNITY
Start: 2023-03-13

## 2023-04-13 NOTE — PROGRESS NOTES
"Subjective     Chief Complaint   Patient presents with   • Gynecologic Exam     annual exam last pap 2022 LGSIL/neg, cancelled colpo, m/g done today, csc never       History of Present Illness    Xochilt Alfonso is a 48 y.o.  who presents for annual exam.    She is due for a rpt pap  Was planning a colposcopy but did not have insurance  Will plan rpt pap today and may need colpo depending on results    Is noting more irregularity to her cycles  Also notes HF at night  Mood changes, started on cymbalta to help with this  Sx's present for past 3 years  Likely perimenopausal    Form of BC is tubal    Does have new pcp  Did do routine blood work    Mammogram today  Is due for CSC, tried to get one done previously but insurance didn't cover    Has new insurance and would like another referral    Her menses are irregular, lasting 0-3 days, dysmenorrhea none   Obstetric History:  OB History        5    Para   4    Term   3       1    AB   1    Living   4       SAB   1    IAB   0    Ectopic   0    Molar        Multiple   0    Live Births   4          Obstetric Comments   One step child \"DELFINO\", one adopted daughter, \"VANESA\"            Menstrual History:     Patient's last menstrual period was 2023 (exact date).         Current contraception: tubal ligation  History of abnormal Pap smear: yes - lgsil, cancelled colpo  Received Gardasil immunization: no  Perform regular self breast exam: yes - occl  Family history of uterine or ovarian cancer: yes - maternal half sister  Family History of colon cancer: no  Family history of breast cancer: no    Mammogram: done today.  Colonoscopy: recommended.  DEXA: not indicated.    Exercise: moderately active  Calcium/Vitamin D: adequate intake    The following portions of the patient's history were reviewed and updated as appropriate: allergies, current medications, past family history, past medical history, past social history, past surgical history and " "problem list.    Review of Systems   All other systems reviewed and are negative.      Review of Systems   Constitutional: Negative for fatigue.   Respiratory: Negative for shortness of breath.    Gastrointestinal: Negative for abdominal pain.   Genitourinary: Negative for dysuria.   Neurological: Negative for headaches.   Psychiatric/Behavioral: Negative for dysphoric mood.         Objective   Physical Exam    /64   Ht 165.1 cm (65\")   Wt 56.2 kg (124 lb)   LMP 04/13/2023 (Exact Date)   BMI 20.63 kg/m²   General:   alert, comfortable and no distress   Heart: regular rate and rhythm   Lungs: clear to auscultation bilaterally   Breast: normal appearance, no masses or tenderness, Inspection negative, No nipple retraction or dimpling, No nipple discharge or bleeding, No axillary or supraclavicular adenopathy, Normal to palpation without dominant masses   Neck: no adenopathy and no carotid bruit   Abdomen: normal findings: soft, non-tender   CVA: Not performed today   Pelvis: External genitalia: normal general appearance  Vaginal: normal mucosa without prolapse or lesions and presence of blood  Cervix: normal appearance and thin prep PAP obtained  Adnexa: normal bimanual exam  Uterus: normal single, nontender   Extremities: Not performed today   Neurologic: negative   Psychiatric: Normal affect, judgement, and mood     Assessment & Plan   Diagnoses and all orders for this visit:    1. Well woman exam with routine gynecological exam (Primary)    2. LGSIL on Pap smear of cervix    3. Screening for colon cancer  -     Ambulatory Referral For Screening Colonoscopy    4. Encounter for Papanicolaou smear for cervical cancer screening  -     IGP, Aptima HPV, Rfx 16 / 18,45    5. Screening for HPV (human papillomavirus)  -     IGP, Aptima HPV, Rfx 16 / 18,45    Other orders  -     Norethin-Eth Estrad-Fe Biphas (Lo Loestrin Fe) 1 MG-10 MCG / 10 MCG tablet; Take 1 tablet by mouth Daily for 28 days.  Dispense: 28 tablet; " Refill: 6        All questions answered.  Breast self exam technique reviewed and patient encouraged to perform self-exam monthly.  Discussed healthy lifestyle modifications.  Recommended 30 minutes of aerobic exercise five times per week.  Discussed calcium needs to prevent osteoporosis.    Pap done, await results to determine next steps  Start bcp to help with perimenopausal sx's, HO given on this as well  Referral for CSC given also

## 2023-04-19 LAB
CYTOLOGIST CVX/VAG CYTO: NORMAL
CYTOLOGY CVX/VAG DOC CYTO: NORMAL
CYTOLOGY CVX/VAG DOC THIN PREP: NORMAL
DX ICD CODE: NORMAL
HIV 1 & 2 AB SER-IMP: NORMAL
HPV GENOTYPE REFLEX: NORMAL
HPV I/H RISK 4 DNA CVX QL PROBE+SIG AMP: NEGATIVE
OTHER STN SPEC: NORMAL
STAT OF ADQ CVX/VAG CYTO-IMP: NORMAL